# Patient Record
Sex: FEMALE | Race: WHITE | NOT HISPANIC OR LATINO | Employment: OTHER | URBAN - METROPOLITAN AREA
[De-identification: names, ages, dates, MRNs, and addresses within clinical notes are randomized per-mention and may not be internally consistent; named-entity substitution may affect disease eponyms.]

---

## 2021-05-27 ENCOUNTER — RECORDS - HEALTHEAST (OUTPATIENT)
Dept: ADMINISTRATIVE | Facility: CLINIC | Age: 66
End: 2021-05-27

## 2021-05-28 ENCOUNTER — RECORDS - HEALTHEAST (OUTPATIENT)
Dept: ADMINISTRATIVE | Facility: CLINIC | Age: 66
End: 2021-05-28

## 2021-05-29 ENCOUNTER — RECORDS - HEALTHEAST (OUTPATIENT)
Dept: ADMINISTRATIVE | Facility: CLINIC | Age: 66
End: 2021-05-29

## 2021-05-30 ENCOUNTER — RECORDS - HEALTHEAST (OUTPATIENT)
Dept: ADMINISTRATIVE | Facility: CLINIC | Age: 66
End: 2021-05-30

## 2021-06-01 ENCOUNTER — RECORDS - HEALTHEAST (OUTPATIENT)
Dept: ADMINISTRATIVE | Facility: CLINIC | Age: 66
End: 2021-06-01

## 2021-06-05 ENCOUNTER — RECORDS - HEALTHEAST (OUTPATIENT)
Dept: FAMILY MEDICINE | Facility: CLINIC | Age: 66
End: 2021-06-05

## 2021-06-05 DIAGNOSIS — M54.2 CERVICALGIA: ICD-10-CM

## 2021-06-09 ENCOUNTER — RECORDS - HEALTHEAST (OUTPATIENT)
Dept: ADMINISTRATIVE | Facility: CLINIC | Age: 66
End: 2021-06-09

## 2021-07-21 ENCOUNTER — RECORDS - HEALTHEAST (OUTPATIENT)
Dept: ADMINISTRATIVE | Facility: CLINIC | Age: 66
End: 2021-07-21

## 2021-12-28 ENCOUNTER — APPOINTMENT (OUTPATIENT)
Dept: GENERAL RADIOLOGY | Facility: CLINIC | Age: 66
End: 2021-12-28
Attending: EMERGENCY MEDICINE
Payer: MEDICARE

## 2021-12-28 ENCOUNTER — APPOINTMENT (OUTPATIENT)
Dept: GENERAL RADIOLOGY | Facility: CLINIC | Age: 66
End: 2021-12-28
Attending: PHYSICIAN ASSISTANT
Payer: MEDICARE

## 2021-12-28 ENCOUNTER — HOSPITAL ENCOUNTER (OUTPATIENT)
Facility: CLINIC | Age: 66
Setting detail: OBSERVATION
Discharge: NURSING, REHAB OR LONG-TERM CARE | End: 2021-12-31
Attending: EMERGENCY MEDICINE | Admitting: EMERGENCY MEDICINE
Payer: MEDICARE

## 2021-12-28 ENCOUNTER — APPOINTMENT (OUTPATIENT)
Dept: GENERAL RADIOLOGY | Facility: CLINIC | Age: 66
End: 2021-12-28
Attending: ORTHOPAEDIC SURGERY
Payer: MEDICARE

## 2021-12-28 DIAGNOSIS — S93.409A SPRAIN OF ANKLE, UNSPECIFIED LATERALITY, UNSPECIFIED LIGAMENT, INITIAL ENCOUNTER: ICD-10-CM

## 2021-12-28 DIAGNOSIS — S82.899A CLOSED FRACTURE OF ANKLE, UNSPECIFIED LATERALITY, INITIAL ENCOUNTER: ICD-10-CM

## 2021-12-28 DIAGNOSIS — K21.00 GASTROESOPHAGEAL REFLUX DISEASE WITH ESOPHAGITIS WITHOUT HEMORRHAGE: Primary | ICD-10-CM

## 2021-12-28 DIAGNOSIS — S52.539A CLOSED COLLES' FRACTURE, UNSPECIFIED LATERALITY, INITIAL ENCOUNTER: ICD-10-CM

## 2021-12-28 LAB
ANION GAP SERPL CALCULATED.3IONS-SCNC: 2 MMOL/L (ref 3–14)
ATRIAL RATE - MUSE: 75 BPM
BASOPHILS # BLD AUTO: 0 10E3/UL (ref 0–0.2)
BASOPHILS NFR BLD AUTO: 0 %
BUN SERPL-MCNC: 17 MG/DL (ref 7–30)
CALCIUM SERPL-MCNC: 9.2 MG/DL (ref 8.5–10.1)
CHLORIDE BLD-SCNC: 105 MMOL/L (ref 94–109)
CO2 SERPL-SCNC: 31 MMOL/L (ref 20–32)
CREAT SERPL-MCNC: 0.71 MG/DL (ref 0.52–1.04)
DIASTOLIC BLOOD PRESSURE - MUSE: NORMAL MMHG
EOSINOPHIL # BLD AUTO: 0 10E3/UL (ref 0–0.7)
EOSINOPHIL NFR BLD AUTO: 0 %
ERYTHROCYTE [DISTWIDTH] IN BLOOD BY AUTOMATED COUNT: 12.4 % (ref 10–15)
GFR SERPL CREATININE-BSD FRML MDRD: >90 ML/MIN/1.73M2
GLUCOSE BLD-MCNC: 127 MG/DL (ref 70–99)
HCT VFR BLD AUTO: 39 % (ref 35–47)
HGB BLD-MCNC: 13.3 G/DL (ref 11.7–15.7)
IMM GRANULOCYTES # BLD: 0.1 10E3/UL
IMM GRANULOCYTES NFR BLD: 1 %
INTERPRETATION ECG - MUSE: NORMAL
LYMPHOCYTES # BLD AUTO: 1.1 10E3/UL (ref 0.8–5.3)
LYMPHOCYTES NFR BLD AUTO: 11 %
MCH RBC QN AUTO: 32.5 PG (ref 26.5–33)
MCHC RBC AUTO-ENTMCNC: 34.1 G/DL (ref 31.5–36.5)
MCV RBC AUTO: 95 FL (ref 78–100)
MONOCYTES # BLD AUTO: 0.6 10E3/UL (ref 0–1.3)
MONOCYTES NFR BLD AUTO: 6 %
NEUTROPHILS # BLD AUTO: 8.2 10E3/UL (ref 1.6–8.3)
NEUTROPHILS NFR BLD AUTO: 82 %
NRBC # BLD AUTO: 0 10E3/UL
NRBC BLD AUTO-RTO: 0 /100
P AXIS - MUSE: 65 DEGREES
PLATELET # BLD AUTO: 234 10E3/UL (ref 150–450)
POTASSIUM BLD-SCNC: 3.8 MMOL/L (ref 3.4–5.3)
PR INTERVAL - MUSE: 168 MS
QRS DURATION - MUSE: 88 MS
QT - MUSE: 398 MS
QTC - MUSE: 444 MS
R AXIS - MUSE: 6 DEGREES
RBC # BLD AUTO: 4.09 10E6/UL (ref 3.8–5.2)
SARS-COV-2 RNA RESP QL NAA+PROBE: NEGATIVE
SODIUM SERPL-SCNC: 138 MMOL/L (ref 133–144)
SYSTOLIC BLOOD PRESSURE - MUSE: NORMAL MMHG
T AXIS - MUSE: 62 DEGREES
VENTRICULAR RATE- MUSE: 75 BPM
WBC # BLD AUTO: 10 10E3/UL (ref 4–11)

## 2021-12-28 PROCEDURE — 73090 X-RAY EXAM OF FOREARM: CPT | Mod: RT

## 2021-12-28 PROCEDURE — 73600 X-RAY EXAM OF ANKLE: CPT | Mod: LT

## 2021-12-28 PROCEDURE — 87635 SARS-COV-2 COVID-19 AMP PRB: CPT | Performed by: EMERGENCY MEDICINE

## 2021-12-28 PROCEDURE — 73100 X-RAY EXAM OF WRIST: CPT | Mod: RT

## 2021-12-28 PROCEDURE — 93005 ELECTROCARDIOGRAM TRACING: CPT

## 2021-12-28 PROCEDURE — 36415 COLL VENOUS BLD VENIPUNCTURE: CPT | Performed by: EMERGENCY MEDICINE

## 2021-12-28 PROCEDURE — C9803 HOPD COVID-19 SPEC COLLECT: HCPCS

## 2021-12-28 PROCEDURE — G0378 HOSPITAL OBSERVATION PER HR: HCPCS

## 2021-12-28 PROCEDURE — 99220 PR INITIAL OBSERVATION CARE,LEVEL III: CPT | Performed by: STUDENT IN AN ORGANIZED HEALTH CARE EDUCATION/TRAINING PROGRAM

## 2021-12-28 PROCEDURE — 80048 BASIC METABOLIC PNL TOTAL CA: CPT | Performed by: EMERGENCY MEDICINE

## 2021-12-28 PROCEDURE — 73130 X-RAY EXAM OF HAND: CPT | Mod: RT

## 2021-12-28 PROCEDURE — 73610 X-RAY EXAM OF ANKLE: CPT | Mod: 50

## 2021-12-28 PROCEDURE — 96374 THER/PROPH/DIAG INJ IV PUSH: CPT

## 2021-12-28 PROCEDURE — 96375 TX/PRO/DX INJ NEW DRUG ADDON: CPT | Mod: 59

## 2021-12-28 PROCEDURE — 99285 EMERGENCY DEPT VISIT HI MDM: CPT | Mod: 25

## 2021-12-28 PROCEDURE — 250N000013 HC RX MED GY IP 250 OP 250 PS 637: Performed by: HOSPITALIST

## 2021-12-28 PROCEDURE — 250N000011 HC RX IP 250 OP 636: Performed by: EMERGENCY MEDICINE

## 2021-12-28 PROCEDURE — 999N000065 XR WRIST RIGHT 2 VIEW: Mod: RT

## 2021-12-28 PROCEDURE — 96376 TX/PRO/DX INJ SAME DRUG ADON: CPT | Mod: 59

## 2021-12-28 PROCEDURE — 250N000013 HC RX MED GY IP 250 OP 250 PS 637: Performed by: STUDENT IN AN ORGANIZED HEALTH CARE EDUCATION/TRAINING PROGRAM

## 2021-12-28 PROCEDURE — 25600 CLTX DST RDL FX/EPHYS SEP WO: CPT | Mod: RT

## 2021-12-28 PROCEDURE — 85014 HEMATOCRIT: CPT | Performed by: EMERGENCY MEDICINE

## 2021-12-28 PROCEDURE — 99207 PR NO CHARGE LOS: CPT | Performed by: HOSPITALIST

## 2021-12-28 PROCEDURE — 73630 X-RAY EXAM OF FOOT: CPT | Mod: RT

## 2021-12-28 RX ORDER — DESVENLAFAXINE 50 MG/1
50 TABLET, FILM COATED, EXTENDED RELEASE ORAL DAILY
COMMUNITY

## 2021-12-28 RX ORDER — HYDROCHLOROTHIAZIDE 12.5 MG/1
12.5 CAPSULE ORAL DAILY
COMMUNITY

## 2021-12-28 RX ORDER — DILTIAZEM HYDROCHLORIDE 240 MG/1
240 CAPSULE, EXTENDED RELEASE ORAL DAILY
COMMUNITY

## 2021-12-28 RX ORDER — DESVENLAFAXINE 50 MG/1
50 TABLET, FILM COATED, EXTENDED RELEASE ORAL DAILY
Status: DISCONTINUED | OUTPATIENT
Start: 2021-12-28 | End: 2021-12-31 | Stop reason: HOSPADM

## 2021-12-28 RX ORDER — KETOCONAZOLE 20 MG/G
CREAM TOPICAL DAILY
COMMUNITY

## 2021-12-28 RX ORDER — LISINOPRIL 20 MG/1
20 TABLET ORAL DAILY
Status: DISCONTINUED | OUTPATIENT
Start: 2021-12-28 | End: 2021-12-31 | Stop reason: HOSPADM

## 2021-12-28 RX ORDER — NALOXONE HYDROCHLORIDE 0.4 MG/ML
0.2 INJECTION, SOLUTION INTRAMUSCULAR; INTRAVENOUS; SUBCUTANEOUS
Status: DISCONTINUED | OUTPATIENT
Start: 2021-12-28 | End: 2021-12-31 | Stop reason: HOSPADM

## 2021-12-28 RX ORDER — NALOXONE HYDROCHLORIDE 0.4 MG/ML
0.4 INJECTION, SOLUTION INTRAMUSCULAR; INTRAVENOUS; SUBCUTANEOUS
Status: DISCONTINUED | OUTPATIENT
Start: 2021-12-28 | End: 2021-12-31 | Stop reason: HOSPADM

## 2021-12-28 RX ORDER — DILTIAZEM HYDROCHLORIDE 240 MG/1
240 CAPSULE, COATED, EXTENDED RELEASE ORAL DAILY
Status: DISCONTINUED | OUTPATIENT
Start: 2021-12-28 | End: 2021-12-31 | Stop reason: HOSPADM

## 2021-12-28 RX ORDER — ONDANSETRON 4 MG/1
4 TABLET, ORALLY DISINTEGRATING ORAL EVERY 6 HOURS PRN
Status: DISCONTINUED | OUTPATIENT
Start: 2021-12-28 | End: 2021-12-31 | Stop reason: HOSPADM

## 2021-12-28 RX ORDER — CLOTRIMAZOLE AND BETAMETHASONE DIPROPIONATE 10; .64 MG/G; MG/G
CREAM TOPICAL 2 TIMES DAILY PRN
COMMUNITY

## 2021-12-28 RX ORDER — ONDANSETRON 2 MG/ML
4 INJECTION INTRAMUSCULAR; INTRAVENOUS EVERY 6 HOURS PRN
Status: DISCONTINUED | OUTPATIENT
Start: 2021-12-28 | End: 2021-12-31 | Stop reason: HOSPADM

## 2021-12-28 RX ORDER — FENTANYL CITRATE 50 UG/ML
100 INJECTION, SOLUTION INTRAMUSCULAR; INTRAVENOUS ONCE
Status: COMPLETED | OUTPATIENT
Start: 2021-12-28 | End: 2021-12-28

## 2021-12-28 RX ORDER — CHOLECALCIFEROL (VITAMIN D3) 50 MCG
1 TABLET ORAL DAILY
COMMUNITY

## 2021-12-28 RX ORDER — HYDROMORPHONE HYDROCHLORIDE 1 MG/ML
0.5 INJECTION, SOLUTION INTRAMUSCULAR; INTRAVENOUS; SUBCUTANEOUS
Status: DISCONTINUED | OUTPATIENT
Start: 2021-12-28 | End: 2021-12-31 | Stop reason: HOSPADM

## 2021-12-28 RX ORDER — ACETAMINOPHEN 325 MG/1
650 TABLET ORAL EVERY 6 HOURS PRN
Status: DISCONTINUED | OUTPATIENT
Start: 2021-12-28 | End: 2021-12-31 | Stop reason: HOSPADM

## 2021-12-28 RX ORDER — MUPIROCIN 20 MG/G
OINTMENT TOPICAL 2 TIMES DAILY
COMMUNITY

## 2021-12-28 RX ORDER — FAMOTIDINE, CALCIUM CARBONATE, AND MAGNESIUM HYDROXIDE 10; 800; 165 MG/1; MG/1; MG/1
1-2 TABLET, CHEWABLE ORAL DAILY PRN
COMMUNITY

## 2021-12-28 RX ORDER — MULTIPLE VITAMINS W/ MINERALS TAB 9MG-400MCG
1 TAB ORAL DAILY
COMMUNITY

## 2021-12-28 RX ORDER — ACETAMINOPHEN 650 MG/1
650 SUPPOSITORY RECTAL EVERY 6 HOURS PRN
Status: DISCONTINUED | OUTPATIENT
Start: 2021-12-28 | End: 2021-12-31 | Stop reason: HOSPADM

## 2021-12-28 RX ORDER — LISINOPRIL 20 MG/1
20 TABLET ORAL DAILY
COMMUNITY

## 2021-12-28 RX ORDER — TRAZODONE HYDROCHLORIDE 50 MG/1
75 TABLET, FILM COATED ORAL AT BEDTIME
COMMUNITY

## 2021-12-28 RX ORDER — HYDROCHLOROTHIAZIDE 12.5 MG/1
12.5 CAPSULE ORAL DAILY
Status: DISCONTINUED | OUTPATIENT
Start: 2021-12-28 | End: 2021-12-31 | Stop reason: HOSPADM

## 2021-12-28 RX ADMIN — ACETAMINOPHEN 650 MG: 325 TABLET, FILM COATED ORAL at 14:34

## 2021-12-28 RX ADMIN — HYDROMORPHONE HYDROCHLORIDE 0.5 MG: 1 INJECTION, SOLUTION INTRAMUSCULAR; INTRAVENOUS; SUBCUTANEOUS at 02:12

## 2021-12-28 RX ADMIN — OXYCODONE HYDROCHLORIDE 2.5 MG: 5 TABLET ORAL at 20:27

## 2021-12-28 RX ADMIN — FENTANYL CITRATE 100 MCG: 50 INJECTION, SOLUTION INTRAMUSCULAR; INTRAVENOUS at 04:02

## 2021-12-28 RX ADMIN — ACETAMINOPHEN 650 MG: 325 TABLET, FILM COATED ORAL at 21:59

## 2021-12-28 RX ADMIN — TRAZODONE HYDROCHLORIDE 75 MG: 50 TABLET ORAL at 22:00

## 2021-12-28 RX ADMIN — DILTIAZEM HYDROCHLORIDE 240 MG: 240 CAPSULE, COATED, EXTENDED RELEASE ORAL at 12:33

## 2021-12-28 RX ADMIN — DESVENLAFAXINE SUCCINATE 50 MG: 50 TABLET, EXTENDED RELEASE ORAL at 12:33

## 2021-12-28 RX ADMIN — HYDROMORPHONE HYDROCHLORIDE 0.5 MG: 1 INJECTION, SOLUTION INTRAMUSCULAR; INTRAVENOUS; SUBCUTANEOUS at 03:13

## 2021-12-28 RX ADMIN — LISINOPRIL 20 MG: 10 TABLET ORAL at 12:32

## 2021-12-28 RX ADMIN — ACETAMINOPHEN 650 MG: 325 TABLET, FILM COATED ORAL at 07:47

## 2021-12-28 RX ADMIN — HYDROCHLOROTHIAZIDE 12.5 MG: 12.5 CAPSULE ORAL at 12:32

## 2021-12-28 ASSESSMENT — MIFFLIN-ST. JEOR: SCORE: 1520.18

## 2021-12-28 NOTE — ED PROVIDER NOTES
History   Chief Complaint:  Fall       HPI   Tresa Combs is a 66 year old female with history of hypertension and vertigo who presents for an evaluation after a fall. The patient stated that while visiting her daughter and sleeping at her house, she needed to get up to the bathroom and being unfamiliar with the house, she has not noticed 4 steps going down and she fell forward and hit the wall. She complains of pain in both ankles and a right wrist. She had also splint placed by EMS to her right forearm. She denies hitting head or losing consciousness.     Review of Systems   Musculoskeletal: Positive for arthralgias (Both ankles and right wrist).   All other systems reviewed and are negative.    Allergies:  Ceclor  Tramadol    Medications:  aspirin 81 MG tablet  buPROPion (WELLBUTRIN SR) 150 MG 12 hr tablet  calcium carbonate (OS-AVELINO) 600 mg (1,500 mg) tablet  cyclobenzaprine (FLEXERIL) 10 MG tablet  diazepam (VALIUM) 5 MG tablet  diltiazem (CARDIZEM CD) 180 MG 24 hr capsule  estradiol (ESTRACE) 0.01 % (0.1 mg/gram) vaginal cream  fluticasone (FLONASE) 50 mcg/actuation nasal spray  lisinopril (PRINIVIL,ZESTRIL) 10 MG tablet  magnesium oxide (MAG-OX) 400 mg tablet  MEDICATION CANNOT BE REORDERED - PLEASE MANUALLY REORDER AND DISCONTINUE THE OLD ORDER  montelukast (SINGULAIR) 10 mg tablet  nitroglycerin (NITROSTAT) 0.4 MG SL tablet  omeprazole (PRILOSEC) 20 MG capsule  pravastatin (PRAVACHOL) 40 MG tablet  thiamine (VITAMIN B-1) 100 MG tablet  vit D3-folic acid-B2-B6-B12 2,000-800-0.32 unit-mcg-mg Tab    Past Medical History:    Hypertension  Depressive disorder  Fibromyalgia  Vertigo  Obesity  Obstructive sleep apnea     Past Surgical History:    Hysterectomy    Family History:    The patient denies past family history.    Social History:  The patient was brought to the emergency department by private vehicle.  The patient presents to the emergency department alone.    Physical Exam     Patient Vitals for the  past 24 hrs:   BP Temp Temp src Pulse Resp SpO2   12/29/21 0353 102/58 98.2  F (36.8  C) Oral 77 16 92 %   12/29/21 0205 -- -- -- -- -- 97 %   12/28/21 2350 107/58 98.4  F (36.9  C) Oral 82 16 92 %   12/28/21 2300 -- -- -- -- -- 95 %   12/28/21 1843 134/72 98  F (36.7  C) Oral 88 16 94 %   12/28/21 1522 129/71 97.9  F (36.6  C) Oral 89 16 96 %   12/28/21 1434 -- -- -- -- 18 --   12/28/21 1327 (!) 155/75 97.9  F (36.6  C) Oral 80 16 96 %   12/28/21 1200 (!) 143/84 98  F (36.7  C) Oral 97 16 96 %     Physical Exam  Constitutional:  Oriented to person, place, and time. Minor distress due to pain.  HENT:   Head:    Normocephalic. Atraumatic.   Mouth/Throat:   Oropharynx is clear and moist.   Eyes:    EOM are normal. Pupils are equal, round, and reactive to light.   Neck:    Neck supple.   Cardiovascular:  Normal rate, regular rhythm and normal heart sounds.      Exam reveals no gallop and no friction rub.       No murmur heard.  Pulmonary/Chest:  Effort normal and breath sounds normal.      No respiratory distress. No wheezes. No rales.      No reproducible chest wall pain.  Abdominal:   Soft. No distension. No tenderness. No rebound and no guarding.   Musculoskeletal:  Normal range of motion. 2+ distal pulses. Angulated deform ity to right wrist. Tenderness to bilateral lateral ankles. Mild swelling to left ankle.   Neurological:   Alert and oriented to person, place, and time.           Moves all 4 extremities spontaneously. Strength sensation intact to all 4 extremities.  Skin:    No rash noted. No pallor. Pain swelling ecchymosis to left ankle and right wrist.     Emergency Department Course     ECG:  ECG taken at 0627, ECG read at 0629  Normal sinus rhythm.  Septal infarct, age undetermined.  Abnormal ECG.  Rate 75 bpm. LA interval 168 ms. QRS duration 88 ms. QT/QTc 398/444 ms. P-R-T axes 65 6 62.    Imaging:  XR Ankle Left 2 Views  Final Result  IMPRESSION: Distal fibular fracture with 1 mm of  displacement.    BELLE ACUÑA MD      XR Foot Right G/E 3 Views  Final Result  IMPRESSION:   1. Fracture of the third proximal metatarsal with slight displacement  and distraction.  2. There is asymmetry and slight subluxation at the second TMT joint  (presumed Lisfranc ligament injury).    BELLE ACUÑA MD     XR Wrist Right 2 Views  Final Result  IMPRESSION: Interval decrease in angulation and displaced fracture involving the distal right radius. There remains very minimal dorsal displacement of fracture fragments. No evidence for acute displaced fracture involving the right ulna or the carpal   bones. Overlying cast material now present.    XR Wrist Right 2 Views  Final Result  IMPRESSION: There is a displaced, comminuted fracture of the distal right radius with significant dorsal angulation on the lateral image. Well-corticated density adjacent to the distal ulna may reflect an old injury. There is no acute, displaced ulnar   fracture. Soft tissue swelling.    XR Hand Right G/E 3 Views  Final Result  IMPRESSION: Redemonstrated dorsally angulated and displaced fracture of the distal right radius. No acute, displaced hand fracture.    Radius/Ulna XR, PA & LAT, right  Final Result  IMPRESSION: Redemonstrated dorsally displaced and angulated distal right radius fracture. The proximal radius and ulna are intact.    XR Ankle Bilateral G/E 3 Views  Final Result  IMPRESSION:   RIGHT ANKLE: No displaced fracture or dislocation. Normal alignment of the ankle mortise on these nonstress views.    LEFT ANKLE: Severe soft tissue swelling laterally with minimally displaced oblique fracture of the lateral malleolus. The ankle mortise is well aligned on these nonstress views. No additional fracture visualized.    Report per radiology    Laboratory:  Labs Ordered and Resulted from Time of ED Arrival to Time of ED Departure   BASIC METABOLIC PANEL - Abnormal       Result Value    Sodium 138      Potassium 3.8      Chloride 105       Carbon Dioxide (CO2) 31      Anion Gap 2 (*)     Urea Nitrogen 17      Creatinine 0.71      Calcium 9.2      Glucose 127 (*)     GFR Estimate >90     COVID-19 VIRUS (CORONAVIRUS) BY PCR - Normal    SARS CoV2 PCR Negative     CBC WITH PLATELETS AND DIFFERENTIAL    WBC Count 10.0      RBC Count 4.09      Hemoglobin 13.3      Hematocrit 39.0      MCV 95      MCH 32.5      MCHC 34.1      RDW 12.4      Platelet Count 234      % Neutrophils 82      % Lymphocytes 11      % Monocytes 6      % Eosinophils 0      % Basophils 0      % Immature Granulocytes 1      NRBCs per 100 WBC 0      Absolute Neutrophils 8.2      Absolute Lymphocytes 1.1      Absolute Monocytes 0.6      Absolute Eosinophils 0.0      Absolute Basophils 0.0      Absolute Immature Granulocytes 0.1      Absolute NRBCs 0.0          Procedures    Fracture Reduction     SITE: right wrist     CONSENT: Risks and benefits, along with alternative treatment modalities, were discussed with the patient.  The patient consented to the procedure verbally and signed the proper paperwork.    ANESTHESIA:  Hematoma block  TECHNIQUE: Traction was applied and angulation was recreated and reduced.  Good alignment was confirmed and post reduction films were obtained.  The patient tolerated the procedure well and there were no complications.     OUTCOME:  Rechecked the patient after sedation was no longer present, findings and plan explained to the patient. Patients status improved.  Pain was reduced and feeling more comfortably.        Splint Placement     Splint was applied and after placement I checked and adjusted the fit to ensure proper positioning. Patient was more comfortable with splint in place. Sensation and circulation are intact after splint placement.    Right wrist  Hematoma block:   Indications: Pain.    Anesthesia:  1% lidocain with epinephrine 10ml.   Description: The area of pain and deformity was cleaned with ChloraPrep, then in sterile fashion a 23g needle was  inserted, small amount of blood from hematoma drawn back, anesthesia instilled, area again cleaned with ChloraPrep  The patient tolerated the procedure well and there were no complication. The patient had good relief from pain after the procedure    Emergency Department Course:    Reviewed:  I reviewed nursing notes, vitals and past medical history    Assessments/Consults  0142 I obtained history and examined the patient as noted above.     Interventions:  Medications   HYDROmorphone (PF) (DILAUDID) injection 0.5 mg (0.5 mg Intravenous Given 12/28/21 0313)   melatonin tablet 1 mg (has no administration in time range)   ondansetron (ZOFRAN-ODT) ODT tab 4 mg (has no administration in time range)     Or   ondansetron (ZOFRAN) injection 4 mg (has no administration in time range)   acetaminophen (TYLENOL) tablet 650 mg (650 mg Oral Given 12/28/21 2159)     Or   acetaminophen (TYLENOL) Suppository 650 mg ( Rectal See Alternative 12/28/21 2159)   oxyCODONE IR (ROXICODONE) half-tab 2.5 mg (2.5 mg Oral Given 12/28/21 2027)   desvenlafaxine (PRISTIQ) 24 hr tablet 50 mg (50 mg Oral Given 12/28/21 1233)   diltiazem ER COATED BEADS (CARDIZEM CD/CARTIA XT) 24 hr capsule 240 mg (240 mg Oral Given 12/28/21 1233)   hydrochlorothiazide (MICROZIDE) capsule 12.5 mg (12.5 mg Oral Given 12/28/21 1232)   lisinopril (ZESTRIL) tablet 20 mg (20 mg Oral Given 12/28/21 1232)   traZODone (DESYREL) half-tab 75 mg (75 mg Oral Given 12/28/21 2200)   naloxone (NARCAN) injection 0.2 mg (has no administration in time range)     Or   naloxone (NARCAN) injection 0.4 mg (has no administration in time range)     Or   naloxone (NARCAN) injection 0.2 mg (has no administration in time range)     Or   naloxone (NARCAN) injection 0.4 mg (has no administration in time range)   fentaNYL (PF) (SUBLIMAZE) injection 100 mcg (100 mcg Intravenous Given 12/28/21 0402)     Disposition:  The patient was admitted to the hospital under the care of Dr. Figueroa.        Impression & Plan     Medical Decision Making:  Tresa Combs is a 66 year old female that presented to the emergency room status post mechanical fall complaining of right wrist pain bilateral ankle pain, denied head or other injuries. Unfortunately x-ray showed colles fracture, left lateral fracture of the ankle and likely ankle bailee on the right. I performed reduction and splinting (please see procedure note). Unfortunately post reduction due to being unable to right upper extremity with bilateral ankles injury she was not ambulatory and required admission with physical therapy discharge planning. The patient will be admitted.     Diagnosis:    ICD-10-CM    1. Closed Colles' fracture, unspecified laterality, initial encounter  S52.539A    2. Closed fracture of ankle, unspecified laterality, initial encounter  S82.899A    3. Sprain of ankle, unspecified laterality, unspecified ligament, initial encounter  S93.409A      Scribe Disclosure:  Jabari JOHNS, am serving as a scribe at 1:41 AM on 12/28/2021 to document services personally performed by Pablito Peter MD based on my observations and the provider's statements to me.            Pablito Peter MD  12/29/21 0802

## 2021-12-28 NOTE — ED NOTES
[facility]  ED Nurse Handoff Report    ED Chief complaint: Fall      ED Diagnosis:   Final diagnoses:   Closed Colles' fracture, unspecified laterality, initial encounter   Closed fracture of ankle, unspecified laterality, initial encounter   Sprain of ankle, unspecified laterality, unspecified ligament, initial encounter       Code Status: not addressed    Allergies:   Allergies   Allergen Reactions     Ceclor [Cefaclor] Rash     Tramadol Rash       Patient Story: fell down 4 stairs hitting wall  Focused Assessment:  C/o pain to right wrist and bilateral ankles. Right wrist splinted. Cast boot applied to left LE and aircast splint to right ankle. Having difficulty with ambulation.    Treatments and/or interventions provided: splint right forearm, cast boot left LE, and aircast splint right ankle  Patient's response to treatments and/or interventions: diffuculty with ambulation with splints    To be done/followed up on inpatient unit:  monitor activity    Does this patient have any cognitive concerns?: none    Activity level - Baseline/Home:  Independent  Activity Level - Current:   Stand with assist x2 and walker    Patient's Preferred language: English   Needed?: No    Isolation: None  Infection: Not Applicable  Patient tested for COVID 19 prior to admission: YES  Bariatric?: No    Vital Signs:   Vitals:    12/28/21 0305 12/28/21 0400 12/28/21 0417 12/28/21 0430   BP: 133/69 132/84  116/67   Pulse: 72   72   Resp:       Temp:       TempSrc:       SpO2: 97% 93% 92% 98%   Weight:       Height:           Cardiac Rhythm:     Was the PSS-3 completed:   Yes  What interventions are required if any?  none             Family Comments: daughter was here but went home. Is here visiting family from [place].  OBS brochure/video discussed/provided to patient/family: Yes              Name of person given brochure if not patient:               Relationship to patient:     For the majority of the shift  this patient's behavior was Green.   Behavioral interventions performed were .    ED NURSE PHONE NUMBER: [phone]

## 2021-12-28 NOTE — H&P (VIEW-ONLY)
[facility]    History and Physical - Hospitalist Service       Date of Admission:  12/28/2021    Assessment & Plan      Tresa Combs is a 66 year old female with past medical history significant for HTN admitted on 12/28/2021 with a fall.     Mechanical Fall  R radius Fracture   L lateral malleolus fracture  R ankle sprain  The patient presents to the ED after a mechanical fall. She is staying with her daughter and reports that she got up to use the restroom and fell down 4 steps. She endorsed pain to both ankles and her right wrist.   * XR of the R forearm shows a displaced, comminuted fracture of the distal right radius with significant dorsal angulation on the lateral image  *XR of the L ankle shows Severe soft tissue swelling laterally with minimally displaced oblique fracture of the lateral malleolus.   * Her radial fracture was reduced and splinted in the ED. However with the ankle fracture and the wrist fracture, she was having significant difficulty ambulating and was not safe to discharge home.   - Orthopedics consult   - Pain control PRN   - Physical therapy consult   - CC/SW consult     HTN  Awaiting med rec. Per patient's list PTA medications include hydrochlorothiazide 12.5mg, Diltiazem 240mg, and lisinopril 20mg daily   - Resume when verified     Obesity  Body mass index is 32.89 kg/m      Diet: Regular diet   DVT Prophylaxis: Pneumatic Compression Devices  León Catheter: Not present  Central Lines: None  Code Status: Full Code     Disposition Plan   Expected Discharge: TBD  Anticipated discharge location:  Awaiting care coordination huddle       The patient's care was discussed with the Patient.    Katina Figueroa  [facility]  Securely message with the Vocera Web Console (learn more here)  Text page via [employer] Paging/Directory        ______________________________________________________________________    Chief Complaint   Fall    History is  obtained from the patient    History of Present Illness   Tresa Combs is a 66 year old female who presents to the ED after a fall.     The patient is visiting from [place] and is staying with her daughter. She woke up in the middle of the night and didn't realize there were 4 steps going down to get to where the bathroom was. She fell down the 4 stairs and hit the wall. She did not hit her head but noted immediate pain in her R wrist and bilateral ankles. She was able to call out for help and her daughter came to her assistance. She did not lose consciousness.     In the ED she was found to have a R radius Fracture and a L lateral malleolus fracture. The wrist was reduced and splinted in the ED and the patient was placed in a boot for the ankle fracture. Given that she could not use her right arm, the patient was having significant difficulty ambulating safely. She will be admitted for PT and possible placement. She would like to go back to her daughters house and eventually fly back to [place].     Review of Systems    The 10 point Review of Systems is negative other than noted in the HPI or here.     Past Medical History    I have reviewed this patient's medical history and updated it with pertinent information if needed.   Past Medical History:   Diagnosis Date     Hypertension        Past Surgical History   I have reviewed this patient's surgical history and updated it with pertinent information if needed.  Past Surgical History:   Procedure Laterality Date     HI VAGINAL HYSTERECTOMY,UTERUS 250 GMS/<      Description: Vaginal Hysterectomy;  Recorded: 01/28/2013;     ZZC TOTAL ABDOM HYSTERECTOMY      Description: Hysterectomy;  Recorded: 08/20/2008;       Social History   I have reviewed this patient's social history and updated it with pertinent information if needed.  Social History     Tobacco Use     Smoking status: Not on file     Smokeless tobacco: Not on file   Substance Use Topics     Alcohol use: Not  on file     Drug use: Not on file       Family History   Reviewed and non-contributory     Prior to Admission Medications   Prior to Admission Medications   Prescriptions Last Dose Informant Patient Reported? Taking?   MEDICATION CANNOT BE REORDERED - PLEASE MANUALLY REORDER AND DISCONTINUE THE OLD ORDER   Yes No   Sig: [OMEGA-3 FATTY ACIDS-VITAMIN E (FISH OIL) 1,000 MG CAP] Use As Directed.   aspirin 81 MG tablet   Yes No   Sig: [ASPIRIN 81 MG TABLET] Take 81 mg by mouth daily.   buPROPion (WELLBUTRIN SR) 150 MG 12 hr tablet   Yes No   Sig: [BUPROPION (WELLBUTRIN SR) 150 MG 12 HR TABLET] Take 150 mg by mouth 2 (two) times a day.   calcium carbonate (OS-AVELINO) 600 mg (1,500 mg) tablet   Yes No   Sig: [CALCIUM CARBONATE (OS-AVELINO) 600 MG (1,500 MG) TABLET] Take 600 mg by mouth 2 (two) times a day with meals.   cyclobenzaprine (FLEXERIL) 10 MG tablet   No No   Sig: [CYCLOBENZAPRINE (FLEXERIL) 10 MG TABLET] TAKE ONE TABLET BY MOUTH AT BEDTIME   diazepam (VALIUM) 5 MG tablet   Yes No   Sig: [DIAZEPAM (VALIUM) 5 MG TABLET] Take 5 mg by mouth. 30 minutes prior to procedure   diltiazem (CARDIZEM CD) 180 MG 24 hr capsule   Yes No   Sig: [DILTIAZEM (CARDIZEM CD) 180 MG 24 HR CAPSULE] Take 180 mg by mouth once daily.   estradiol (ESTRACE) 0.01 % (0.1 mg/gram) vaginal cream   Yes No   Sig: [ESTRADIOL (ESTRACE) 0.01 % (0.1 MG/GRAM) VAGINAL CREAM] Use As Directed 2 (two) times a week.   fluticasone (FLONASE) 50 mcg/actuation nasal spray   Yes No   Sig: [FLUTICASONE (FLONASE) 50 MCG/ACTUATION NASAL SPRAY] 1 spray into each nostril once daily.   lisinopril (PRINIVIL,ZESTRIL) 10 MG tablet   Yes No   Sig: [LISINOPRIL (PRINIVIL,ZESTRIL) 10 MG TABLET] Take 10 mg by mouth daily.   magnesium oxide (MAG-OX) 400 mg tablet   Yes No   Sig: [MAGNESIUM OXIDE (MAG-OX) 400 MG TABLET] Take by mouth.   montelukast (SINGULAIR) 10 mg tablet   Yes No   Sig: [MONTELUKAST (SINGULAIR) 10 MG TABLET] Take by mouth.   nitroglycerin (NITROSTAT) 0.4 MG SL  tablet   Yes No   Sig: [NITROGLYCERIN (NITROSTAT) 0.4 MG SL TABLET] 0.4 mg. Dissolve 1 tablet under the tongue as needed for chest pain. May repeat every 5 minutes for 2 more doses.   omeprazole (PRILOSEC) 20 MG capsule   Yes No   Sig: [OMEPRAZOLE (PRILOSEC) 20 MG CAPSULE] Take 20 mg by mouth daily.   pravastatin (PRAVACHOL) 40 MG tablet   Yes No   Sig: [PRAVASTATIN (PRAVACHOL) 40 MG TABLET] Take 40 mg by mouth daily.   thiamine (VITAMIN B-1) 100 MG tablet   Yes No   Sig: [THIAMINE (VITAMIN B-1) 100 MG TABLET] Take by mouth.   vit D3-folic acid-B2-B6-B12 2,000-800-0.32 unit-mcg-mg Tab   Yes No   Sig: [VIT D3-FOLIC ACID-B2-B6-B12 2,000-800-0.32 UNIT-MCG-MG TAB] Take by mouth.      Facility-Administered Medications: None     Allergies   Allergies   Allergen Reactions     Ceclor [Cefaclor] Rash     Tramadol Rash       Physical Exam   Vital Signs: Temp: 97.8  F (36.6  C) Temp src: Oral BP: 116/67 Pulse: 72   Resp: 16 SpO2: 98 % O2 Device: Nasal cannula Oxygen Delivery: 2 LPM  Weight: 210 lbs 0 oz    Constitutional: Awake, alert, cooperative, no apparent distress.  Eyes: Conjunctiva and pupils examined and normal.  HEENT: Moist mucous membranes, normal dentition.  Respiratory: Clear to auscultation bilaterally, no crackles or wheezing.  Cardiovascular: Regular rate and rhythm, normal S1 and S2, and no murmur noted.  GI: Soft, non-distended, non-tender, normal bowel sounds.  Skin: No rashes, no cyanosis, no edema.  Musculoskeletal: Right arm is bandaged, left foot in boot, right ankle splinted   Neurologic: Cranial nerves 2-12 intact, normal strength and sensation.  Psychiatric: Alert, oriented to person, place and time, no obvious anxiety or depression.      Data   Data reviewed today: I reviewed all medications, new labs and imaging results over the last 24 hours.     No lab results found in last 7 days.    Recent Results (from the past 24 hour(s))   XR Ankle Bilateral G/E 3 Views    Narrative    EXAM: XR ANKLE  BILATERAL G/E 3 VIEWS  LOCATION: [facility]  DATE/TIME: 12/28/2021 2:06 AM    INDICATION: Injury and pain.  COMPARISON: None.      Impression    IMPRESSION:   RIGHT ANKLE: No displaced fracture or dislocation. Normal alignment of the ankle mortise on these nonstress views.    LEFT ANKLE: Severe soft tissue swelling laterally with minimally displaced oblique fracture of the lateral malleolus. The ankle mortise is well aligned on these nonstress views. No additional fracture visualized.   Radius/Ulna XR, PA & LAT, right    Narrative    EXAM: XR FOREARM RIGHT 2 VIEWS  LOCATION: [facility]  DATE/TIME: 12/28/2021 2:06 AM    INDICATION: Pain.  COMPARISON: Wrist radiographs performed concurrently.      Impression    IMPRESSION: Redemonstrated dorsally displaced and angulated distal right radius fracture. The proximal radius and ulna are intact.   XR Hand Right G/E 3 Views    Narrative    EXAM: XR HAND RT G/E 3 VW  LOCATION: [facility]  DATE/TIME: 12/28/2021 2:06 AM    INDICATION: Pain.  COMPARISON: Wrist radiographs performed concurrently.      Impression    IMPRESSION: Redemonstrated dorsally angulated and displaced fracture of the distal right radius. No acute, displaced hand fracture.   XR Wrist Right 2 Views    Narrative    EXAM: XR WRIST RIGHT 2 VIEW  LOCATION: [facility]  DATE/TIME: 12/28/2021 2:06 AM    INDICATION: Injury and pain.  COMPARISON: None.      Impression    IMPRESSION: There is a displaced, comminuted fracture of the distal right radius with significant dorsal angulation on the lateral image. Well-corticated density adjacent to the distal ulna may reflect an old injury. There is no acute, displaced ulnar   fracture. Soft tissue swelling.   XR Wrist Right 2 Views    Narrative    EXAM: XR WRIST RIGHT 2 VIEW  LOCATION: [facility]  DATE/TIME: 12/28/2021 4:35 AM    INDICATION: Post  reduction images of an impacted right wrist fracture.  COMPARISON: 12/28/2021      Impression    IMPRESSION: Interval decrease in angulation and displaced fracture involving the distal right radius. There remains very minimal dorsal displacement of fracture fragments. No evidence for acute displaced fracture involving the right ulna or the carpal   bones. Overlying cast material now present.

## 2021-12-28 NOTE — PROGRESS NOTES
Consult received.    Right distal radius fracture, well reduced by ED providers.    Nondisplaced left ankle lateral malleolus fracture,  Will obtain external rotation gravity stress XR to confirm stability.  If stable, will be able to WBAT in CAM boot.    Neither injury will require surgery during this hospitalization, follow up with Dr Ordoñez (hand surgeon) as outpatient to discuss op vs nonop management of distal radius fracture.      Pablito Anand MD  [facility]

## 2021-12-28 NOTE — PROGRESS NOTES
Brief, no charge note; patient admitted by my colleague Dr Figueroa early this morning    Tresa Combs is a 66 year old female with PMH significant for HTN admitted on 12/28/2021 with a mechanical fall resulting in right radius fracture and Left lateral malleolus fracture    -Evaluated by orthopedics, suggested nonoperative measures for both; also obtaining external rotation gravity stress XRay to confirm stability and if stable plan for WBAT in CAM boot for the lateral malleolus fracture  -pain control with PRN pain medications  -PT evaluation pending  - SW for disposition planning    Rest care plan per H&P by Dr Figueroa

## 2021-12-28 NOTE — PROVIDER NOTIFICATION
Provider Notification    Notified Person: PA    Notified Person Name: Peyton Yeh    Notification Date/Time: 12/28 @ 5672    Notification Interaction: Cell phone    Purpose of Notification: Right foot xrays complete    Orders Received: Peyton will update her note. Non weight bear for now.

## 2021-12-28 NOTE — H&P
[facility]    History and Physical - Hospitalist Service       Date of Admission:  12/28/2021    Assessment & Plan      Tresa Combs is a 66 year old female with past medical history significant for HTN admitted on 12/28/2021 with a fall.     Mechanical Fall  R radius Fracture   L lateral malleolus fracture  R ankle sprain  The patient presents to the ED after a mechanical fall. She is staying with her daughter and reports that she got up to use the restroom and fell down 4 steps. She endorsed pain to both ankles and her right wrist.   * XR of the R forearm shows a displaced, comminuted fracture of the distal right radius with significant dorsal angulation on the lateral image  *XR of the L ankle shows Severe soft tissue swelling laterally with minimally displaced oblique fracture of the lateral malleolus.   * Her radial fracture was reduced and splinted in the ED. However with the ankle fracture and the wrist fracture, she was having significant difficulty ambulating and was not safe to discharge home.   - Orthopedics consult   - Pain control PRN   - Physical therapy consult   - CC/SW consult     HTN  Awaiting med rec. Per patient's list PTA medications include hydrochlorothiazide 12.5mg, Diltiazem 240mg, and lisinopril 20mg daily   - Resume when verified     Obesity  Body mass index is 32.89 kg/m      Diet: Regular diet   DVT Prophylaxis: Pneumatic Compression Devices  León Catheter: Not present  Central Lines: None  Code Status: Full Code     Disposition Plan   Expected Discharge: TBD  Anticipated discharge location:  Awaiting care coordination huddle       The patient's care was discussed with the Patient.    aKtina Figueroa  [facility]  Securely message with the Vocera Web Console (learn more here)  Text page via [employer] Paging/Directory        ______________________________________________________________________    Chief Complaint   Fall    History is  obtained from the patient    History of Present Illness   Tresa Combs is a 66 year old female who presents to the ED after a fall.     The patient is visiting from [place] and is staying with her daughter. She woke up in the middle of the night and didn't realize there were 4 steps going down to get to where the bathroom was. She fell down the 4 stairs and hit the wall. She did not hit her head but noted immediate pain in her R wrist and bilateral ankles. She was able to call out for help and her daughter came to her assistance. She did not lose consciousness.     In the ED she was found to have a R radius Fracture and a L lateral malleolus fracture. The wrist was reduced and splinted in the ED and the patient was placed in a boot for the ankle fracture. Given that she could not use her right arm, the patient was having significant difficulty ambulating safely. She will be admitted for PT and possible placement. She would like to go back to her daughters house and eventually fly back to [place].     Review of Systems    The 10 point Review of Systems is negative other than noted in the HPI or here.     Past Medical History    I have reviewed this patient's medical history and updated it with pertinent information if needed.   Past Medical History:   Diagnosis Date     Hypertension        Past Surgical History   I have reviewed this patient's surgical history and updated it with pertinent information if needed.  Past Surgical History:   Procedure Laterality Date     MI VAGINAL HYSTERECTOMY,UTERUS 250 GMS/<      Description: Vaginal Hysterectomy;  Recorded: 01/28/2013;     ZZC TOTAL ABDOM HYSTERECTOMY      Description: Hysterectomy;  Recorded: 08/20/2008;       Social History   I have reviewed this patient's social history and updated it with pertinent information if needed.  Social History     Tobacco Use     Smoking status: Not on file     Smokeless tobacco: Not on file   Substance Use Topics     Alcohol use: Not  on file     Drug use: Not on file       Family History   Reviewed and non-contributory     Prior to Admission Medications   Prior to Admission Medications   Prescriptions Last Dose Informant Patient Reported? Taking?   MEDICATION CANNOT BE REORDERED - PLEASE MANUALLY REORDER AND DISCONTINUE THE OLD ORDER   Yes No   Sig: [OMEGA-3 FATTY ACIDS-VITAMIN E (FISH OIL) 1,000 MG CAP] Use As Directed.   aspirin 81 MG tablet   Yes No   Sig: [ASPIRIN 81 MG TABLET] Take 81 mg by mouth daily.   buPROPion (WELLBUTRIN SR) 150 MG 12 hr tablet   Yes No   Sig: [BUPROPION (WELLBUTRIN SR) 150 MG 12 HR TABLET] Take 150 mg by mouth 2 (two) times a day.   calcium carbonate (OS-AVELINO) 600 mg (1,500 mg) tablet   Yes No   Sig: [CALCIUM CARBONATE (OS-AVELINO) 600 MG (1,500 MG) TABLET] Take 600 mg by mouth 2 (two) times a day with meals.   cyclobenzaprine (FLEXERIL) 10 MG tablet   No No   Sig: [CYCLOBENZAPRINE (FLEXERIL) 10 MG TABLET] TAKE ONE TABLET BY MOUTH AT BEDTIME   diazepam (VALIUM) 5 MG tablet   Yes No   Sig: [DIAZEPAM (VALIUM) 5 MG TABLET] Take 5 mg by mouth. 30 minutes prior to procedure   diltiazem (CARDIZEM CD) 180 MG 24 hr capsule   Yes No   Sig: [DILTIAZEM (CARDIZEM CD) 180 MG 24 HR CAPSULE] Take 180 mg by mouth once daily.   estradiol (ESTRACE) 0.01 % (0.1 mg/gram) vaginal cream   Yes No   Sig: [ESTRADIOL (ESTRACE) 0.01 % (0.1 MG/GRAM) VAGINAL CREAM] Use As Directed 2 (two) times a week.   fluticasone (FLONASE) 50 mcg/actuation nasal spray   Yes No   Sig: [FLUTICASONE (FLONASE) 50 MCG/ACTUATION NASAL SPRAY] 1 spray into each nostril once daily.   lisinopril (PRINIVIL,ZESTRIL) 10 MG tablet   Yes No   Sig: [LISINOPRIL (PRINIVIL,ZESTRIL) 10 MG TABLET] Take 10 mg by mouth daily.   magnesium oxide (MAG-OX) 400 mg tablet   Yes No   Sig: [MAGNESIUM OXIDE (MAG-OX) 400 MG TABLET] Take by mouth.   montelukast (SINGULAIR) 10 mg tablet   Yes No   Sig: [MONTELUKAST (SINGULAIR) 10 MG TABLET] Take by mouth.   nitroglycerin (NITROSTAT) 0.4 MG SL  tablet   Yes No   Sig: [NITROGLYCERIN (NITROSTAT) 0.4 MG SL TABLET] 0.4 mg. Dissolve 1 tablet under the tongue as needed for chest pain. May repeat every 5 minutes for 2 more doses.   omeprazole (PRILOSEC) 20 MG capsule   Yes No   Sig: [OMEPRAZOLE (PRILOSEC) 20 MG CAPSULE] Take 20 mg by mouth daily.   pravastatin (PRAVACHOL) 40 MG tablet   Yes No   Sig: [PRAVASTATIN (PRAVACHOL) 40 MG TABLET] Take 40 mg by mouth daily.   thiamine (VITAMIN B-1) 100 MG tablet   Yes No   Sig: [THIAMINE (VITAMIN B-1) 100 MG TABLET] Take by mouth.   vit D3-folic acid-B2-B6-B12 2,000-800-0.32 unit-mcg-mg Tab   Yes No   Sig: [VIT D3-FOLIC ACID-B2-B6-B12 2,000-800-0.32 UNIT-MCG-MG TAB] Take by mouth.      Facility-Administered Medications: None     Allergies   Allergies   Allergen Reactions     Ceclor [Cefaclor] Rash     Tramadol Rash       Physical Exam   Vital Signs: Temp: 97.8  F (36.6  C) Temp src: Oral BP: 116/67 Pulse: 72   Resp: 16 SpO2: 98 % O2 Device: Nasal cannula Oxygen Delivery: 2 LPM  Weight: 210 lbs 0 oz    Constitutional: Awake, alert, cooperative, no apparent distress.  Eyes: Conjunctiva and pupils examined and normal.  HEENT: Moist mucous membranes, normal dentition.  Respiratory: Clear to auscultation bilaterally, no crackles or wheezing.  Cardiovascular: Regular rate and rhythm, normal S1 and S2, and no murmur noted.  GI: Soft, non-distended, non-tender, normal bowel sounds.  Skin: No rashes, no cyanosis, no edema.  Musculoskeletal: Right arm is bandaged, left foot in boot, right ankle splinted   Neurologic: Cranial nerves 2-12 intact, normal strength and sensation.  Psychiatric: Alert, oriented to person, place and time, no obvious anxiety or depression.      Data   Data reviewed today: I reviewed all medications, new labs and imaging results over the last 24 hours.     No lab results found in last 7 days.    Recent Results (from the past 24 hour(s))   XR Ankle Bilateral G/E 3 Views    Narrative    EXAM: XR ANKLE  BILATERAL G/E 3 VIEWS  LOCATION: [facility]  DATE/TIME: 12/28/2021 2:06 AM    INDICATION: Injury and pain.  COMPARISON: None.      Impression    IMPRESSION:   RIGHT ANKLE: No displaced fracture or dislocation. Normal alignment of the ankle mortise on these nonstress views.    LEFT ANKLE: Severe soft tissue swelling laterally with minimally displaced oblique fracture of the lateral malleolus. The ankle mortise is well aligned on these nonstress views. No additional fracture visualized.   Radius/Ulna XR, PA & LAT, right    Narrative    EXAM: XR FOREARM RIGHT 2 VIEWS  LOCATION: [facility]  DATE/TIME: 12/28/2021 2:06 AM    INDICATION: Pain.  COMPARISON: Wrist radiographs performed concurrently.      Impression    IMPRESSION: Redemonstrated dorsally displaced and angulated distal right radius fracture. The proximal radius and ulna are intact.   XR Hand Right G/E 3 Views    Narrative    EXAM: XR HAND RT G/E 3 VW  LOCATION: [facility]  DATE/TIME: 12/28/2021 2:06 AM    INDICATION: Pain.  COMPARISON: Wrist radiographs performed concurrently.      Impression    IMPRESSION: Redemonstrated dorsally angulated and displaced fracture of the distal right radius. No acute, displaced hand fracture.   XR Wrist Right 2 Views    Narrative    EXAM: XR WRIST RIGHT 2 VIEW  LOCATION: [facility]  DATE/TIME: 12/28/2021 2:06 AM    INDICATION: Injury and pain.  COMPARISON: None.      Impression    IMPRESSION: There is a displaced, comminuted fracture of the distal right radius with significant dorsal angulation on the lateral image. Well-corticated density adjacent to the distal ulna may reflect an old injury. There is no acute, displaced ulnar   fracture. Soft tissue swelling.   XR Wrist Right 2 Views    Narrative    EXAM: XR WRIST RIGHT 2 VIEW  LOCATION: [facility]  DATE/TIME: 12/28/2021 4:35 AM    INDICATION: Post  reduction images of an impacted right wrist fracture.  COMPARISON: 12/28/2021      Impression    IMPRESSION: Interval decrease in angulation and displaced fracture involving the distal right radius. There remains very minimal dorsal displacement of fracture fragments. No evidence for acute displaced fracture involving the right ulna or the carpal   bones. Overlying cast material now present.

## 2021-12-28 NOTE — CONSULTS
[facility]    Orthopedic Consultation    Tresa Combs MRN# [id number]   Age: 66 year old YOB: 1955     Date of Admission: 12/28/2021    Reason for consult: Right distal radius fracture  Left non-displaced distal fibula fracture  Right proximal foot pain, possible fracture       Requesting provider: Katina Figueroa       Level of consult: Consult, follow and place orders           Assessment and Plan:   Assessment:   Right distal radius fracture  Left non-displaced distal fibula fracture  Right proximal foot pain, possible fracture      Plan:   Keep splint applied to right UE  Non-WB through right wrist but okay to use platform walker  Elevate on 3 pillows when at rest    CAM boot on left ankle.  Ok to remove for icing  WBAT Left LE in boot  Elevate and ice when at rest    Right foot x-rays ordered  Patient does not need the air cast.   NWB on Right foot until Xrays obtained.  Ice and elevate            Chief Complaint:   Right wrist and bilateral ankle/foot pain          History of Present Illness:   Tresa Combs is a 66 year old female who presented to the ED after a fall.     The patient is visiting from [place] and is staying with her daughter. She woke up in the middle of the night and didn't realize there were 4 steps going down to get to where the bathroom was. She fell down the 4 stairs and hit the wall. She had immediate pain in her Right wrist, right foot and left ankle.   In the ED she was found to have a Right distal radius Fracture and a Left lateral malleolus fracture. The wrist was reduced and splinted in the ED and the patient was placed in a boot for the ankle fracture. Xrays of the right ankle were negative and an air cast was applied.  She reports continued pain in her right foot              Past Medical History:     Past Medical History:   Diagnosis Date     Hypertension              Past Surgical History:     Past Surgical History:   Procedure  Laterality Date     OR VAGINAL HYSTERECTOMY,UTERUS 250 GMS/<      Description: Vaginal Hysterectomy;  Recorded: 01/28/2013;     ZZC TOTAL ABDOM HYSTERECTOMY      Description: Hysterectomy;  Recorded: 08/20/2008;             Social History:     Social History     Tobacco Use     Smoking status: Not on file     Smokeless tobacco: Not on file   Substance Use Topics     Alcohol use: Not on file             Family History:   No family history on file.          Immunizations:     VACCINE/DOSE   Diptheria   DPT   DTAP   HBIG   Hepatitis A   Hepatitis B   HIB   Influenza   Measles   Meningococcal   MMR   Mumps   Pneumococcal   Polio   Rubella   Small Pox   TDAP   Varicella   Zoster             Allergies:     Allergies   Allergen Reactions     Ceclor [Cefaclor] Rash     Tramadol Rash             Medications:     Current Facility-Administered Medications   Medication     acetaminophen (TYLENOL) tablet 650 mg    Or     acetaminophen (TYLENOL) Suppository 650 mg     desvenlafaxine (PRISTIQ) 24 hr tablet 50 mg     diltiazem ER COATED BEADS (CARDIZEM CD/CARTIA XT) 24 hr capsule 240 mg     hydrochlorothiazide (MICROZIDE) capsule 12.5 mg     HYDROmorphone (PF) (DILAUDID) injection 0.5 mg     lisinopril (ZESTRIL) tablet 20 mg     melatonin tablet 1 mg     naloxone (NARCAN) injection 0.2 mg    Or     naloxone (NARCAN) injection 0.4 mg    Or     naloxone (NARCAN) injection 0.2 mg    Or     naloxone (NARCAN) injection 0.4 mg     ondansetron (ZOFRAN-ODT) ODT tab 4 mg    Or     ondansetron (ZOFRAN) injection 4 mg     oxyCODONE IR (ROXICODONE) half-tab 2.5 mg     traZODone (DESYREL) half-tab 75 mg             Review of Systems:   ROS:  10 point ROS neg other than the symptoms noted above in the HPI.            Physical Exam:   All vitals have been reviewed  Patient Vitals for the past 24 hrs:   BP Temp Temp src Pulse Resp SpO2 Height Weight   12/28/21 1327 (!) 155/75 97.9  F (36.6  C) Oral 80 16 96 % -- --   12/28/21 1200 (!) 143/84 98  F  "(36.7  C) Oral 97 16 96 % -- --   12/28/21 0500 122/68 -- -- 72 -- 97 % -- --   12/28/21 0430 116/67 -- -- 72 -- 98 % -- --   12/28/21 0417 -- -- -- -- -- 92 % -- --   12/28/21 0400 132/84 -- -- -- -- 93 % -- --   12/28/21 0305 133/69 -- -- 72 -- 97 % -- --   12/28/21 0300 133/69 -- -- -- -- 93 % -- --   12/28/21 0137 (!) 153/80 97.8  F (36.6  C) Oral 77 16 97 % 1.702 m (5' 7\") 95.3 kg (210 lb)     No intake or output data in the 24 hours ending 12/28/21 1424      Physical Exam   Temp: 97.9  F (36.6  C) Temp src: Oral BP: (!) 155/75 Pulse: 80   Resp: 16 SpO2: 96 % O2 Device: None (Room air) Oxygen Delivery: 2 LPM  Vital Signs with Ranges  Temp:  [97.8  F (36.6  C)-98  F (36.7  C)] 97.9  F (36.6  C)  Pulse:  [72-97] 80  Resp:  [16] 16  BP: (116-155)/(67-84) 155/75  SpO2:  [92 %-98 %] 96 %  210 lbs 0 oz    Constitutional: Pleasant, alert, appropriate, following commands.  HEENT: Head atraumatic normocephalic. Pupils equal round and reactive to light.  Respiratory: Unlabored breathing no audible wheeze  Cardiovascular: Regular rate and rhythm per pulses  GI: Abdomen non-distended.  Lymph/Hematologic: No lymphadenopathy in areas examined  Genitourinary:  No wu  Skin: No rashes, no cyanosis, no edema.  Musculoskeletal:   Right wrist: splint intact, able to flex and extend fingers.  Sensation and pulses intact and equal bilaterally.  Brisk cap refill.    Left ankle: Lateral swelling and ecchymosis at the distal fibula.  TTP at the same.  Able to flex and extend toes.  Sensation and pulses intact.  No medial ankle tenderness   Right foot/ankle: ecchymosis and swelling along the dorsal right foot.  TTP along the proximal 2nd and 3rd metatarsals.  Neurovascularly intact.  No TTP along the medial or lateral ankle ligaments.  Able to flex/extend toes with some pain.   Neurologic: normal without focal findings, mental status, speech normal, alert and oriented x iii  Neuropsychiatric: stable            Data:   All laboratory " data reviewed  Results for orders placed or performed during the hospital encounter of 12/28/21   Radius/Ulna XR, PA & LAT, right     Status: None    Narrative    EXAM: XR FOREARM RIGHT 2 VIEWS  LOCATION: [facility]  DATE/TIME: 12/28/2021 2:06 AM    INDICATION: Pain.  COMPARISON: Wrist radiographs performed concurrently.      Impression    IMPRESSION: Redemonstrated dorsally displaced and angulated distal right radius fracture. The proximal radius and ulna are intact.   XR Wrist Right 2 Views     Status: None    Narrative    EXAM: XR WRIST RIGHT 2 VIEW  LOCATION: [facility]  DATE/TIME: 12/28/2021 2:06 AM    INDICATION: Injury and pain.  COMPARISON: None.      Impression    IMPRESSION: There is a displaced, comminuted fracture of the distal right radius with significant dorsal angulation on the lateral image. Well-corticated density adjacent to the distal ulna may reflect an old injury. There is no acute, displaced ulnar   fracture. Soft tissue swelling.   XR Hand Right G/E 3 Views     Status: None    Narrative    EXAM: XR HAND RT G/E 3 VW  LOCATION: [facility]  DATE/TIME: 12/28/2021 2:06 AM    INDICATION: Pain.  COMPARISON: Wrist radiographs performed concurrently.      Impression    IMPRESSION: Redemonstrated dorsally angulated and displaced fracture of the distal right radius. No acute, displaced hand fracture.   XR Ankle Bilateral G/E 3 Views     Status: None    Narrative    EXAM: XR ANKLE BILATERAL G/E 3 VIEWS  LOCATION: [facility]  DATE/TIME: 12/28/2021 2:06 AM    INDICATION: Injury and pain.  COMPARISON: None.      Impression    IMPRESSION:   RIGHT ANKLE: No displaced fracture or dislocation. Normal alignment of the ankle mortise on these nonstress views.    LEFT ANKLE: Severe soft tissue swelling laterally with minimally displaced oblique fracture of the lateral malleolus. The ankle mortise is well aligned  on these nonstress views. No additional fracture visualized.   XR Wrist Right 2 Views     Status: None    Narrative    EXAM: XR WRIST RIGHT 2 VIEW  LOCATION: [facility]  DATE/TIME: 12/28/2021 4:35 AM    INDICATION: Post reduction images of an impacted right wrist fracture.  COMPARISON: 12/28/2021      Impression    IMPRESSION: Interval decrease in angulation and displaced fracture involving the distal right radius. There remains very minimal dorsal displacement of fracture fragments. No evidence for acute displaced fracture involving the right ulna or the carpal   bones. Overlying cast material now present.   Basic metabolic panel     Status: Abnormal   Result Value Ref Range    Sodium 138 133 - 144 mmol/L    Potassium 3.8 3.4 - 5.3 mmol/L    Chloride 105 94 - 109 mmol/L    Carbon Dioxide (CO2) 31 20 - 32 mmol/L    Anion Gap 2 (L) 3 - 14 mmol/L    Urea Nitrogen 17 7 - 30 mg/dL    Creatinine 0.71 0.52 - 1.04 mg/dL    Calcium 9.2 8.5 - 10.1 mg/dL    Glucose 127 (H) 70 - 99 mg/dL    GFR Estimate >90 >60 mL/min/1.73m2   Asymptomatic COVID-19 Virus (Coronavirus) by PCR Nasopharyngeal     Status: Normal    Specimen: Nasopharyngeal; Swab   Result Value Ref Range    SARS CoV2 PCR Negative Negative    Narrative    Testing was performed using the sarah  SARS-CoV-2 & Influenza A/B Assay on the sarah  [employer]  System.  This test should be ordered for the detection of SARS-COV-2 in individuals who meet SARS-CoV-2 clinical and/or epidemiological criteria. Test performance is unknown in asymptomatic patients.  This test is for in vitro diagnostic use under the FDA EUA for laboratories certified under CLIA to perform moderate and/or high complexity testing. This test has not been FDA cleared or approved.  A negative test does not rule out the presence of PCR inhibitors in the specimen or target RNA in concentration below the limit of detection for the assay. The possibility of a false negative should be  considered if the patient's recent exposure or clinical presentation suggests COVID-19.  [facility] are certified under the Clinical Laboratory Improvement Amendments of 1988 (CLIA-88) as qualified to perform moderate and/or high complexity laboratory testing.   CBC with platelets and differential     Status: None   Result Value Ref Range    WBC Count 10.0 4.0 - 11.0 10e3/uL    RBC Count 4.09 3.80 - 5.20 10e6/uL    Hemoglobin 13.3 11.7 - 15.7 g/dL    Hematocrit 39.0 35.0 - 47.0 %    MCV 95 78 - 100 fL    MCH 32.5 26.5 - 33.0 pg    MCHC 34.1 31.5 - 36.5 g/dL    RDW 12.4 10.0 - 15.0 %    Platelet Count 234 150 - 450 10e3/uL    % Neutrophils 82 %    % Lymphocytes 11 %    % Monocytes 6 %    % Eosinophils 0 %    % Basophils 0 %    % Immature Granulocytes 1 %    NRBCs per 100 WBC 0 <1 /100    Absolute Neutrophils 8.2 1.6 - 8.3 10e3/uL    Absolute Lymphocytes 1.1 0.8 - 5.3 10e3/uL    Absolute Monocytes 0.6 0.0 - 1.3 10e3/uL    Absolute Eosinophils 0.0 0.0 - 0.7 10e3/uL    Absolute Basophils 0.0 0.0 - 0.2 10e3/uL    Absolute Immature Granulocytes 0.1 <=0.4 10e3/uL    Absolute NRBCs 0.0 10e3/uL   EKG 12 lead     Status: None   Result Value Ref Range    Systolic Blood Pressure  mmHg    Diastolic Blood Pressure  mmHg    Ventricular Rate 75 BPM    Atrial Rate 75 BPM    OR Interval 168 ms    QRS Duration 88 ms     ms    QTc 444 ms    P Axis 65 degrees    R AXIS 6 degrees    T Axis 62 degrees    Interpretation ECG       Sinus rhythm  Septal infarct , age undetermined  Abnormal ECG  When compared with ECG of 28-SEP-2009 15:19,  Septal infarct is now Present  Confirmed by GENERATED REPORT, COMPUTER ([id number]),  Kamilla Valverde ([id number]) on 12/28/2021 9:56:58 AM     CBC with platelets + differential     Status: None    Narrative    The following orders were created for panel order CBC with platelets + differential.  Procedure                               Abnormality         Status                      ---------                               -----------         ------                     CBC with platelets and d...[[id number]]                      Final result                 Please view results for these tests on the individual orders.          Attestation:  I have reviewed today's vital signs, notes, medications, labs and imaging with Dr. Anand.  Amount of time performed on this consult: 30 minutes.    Peyton Yeh PA-C

## 2021-12-28 NOTE — PHARMACY-ADMISSION MEDICATION HISTORY
"Pharmacy Medication History  Admission medication history interview status for the 12/28/2021  admission is complete. See EPIC admission navigator for prior to admission medications     Location of Interview: Patient room  Medication history sources: Patient, Surescripts, Patient's home med list and Care Everywhere    Significant changes made to the medication list:  Removed: aspirin 81 mg daily, wellbutrin 150 mg BID, flexeril 10 mg at bedtime, diazepam 5 mg once, estradiol vaginal cream 2x/week, fish oil daily, montelukast 10 mg daily, omeprazole 20 mg daily, pravastatin 40 mg daily, thiamine 100 mg daily  Added: mupirocin, ketoconazole, clotrimazole/betamethasone, desvenlafaxine, hydrochlorothiazide, trazodone, pepcid complete, mvi   Adjusted:   -calcium 600 mg BID --> 400 mg daily  -diltiazem 180 --> 240 mg daily  -lisinopril 10 --> 20 mg daily  -magnesium oxide daily --> reacted magnesium 2 capsules daily  -vitamin D + B complex --> vitamin D single ingredient    In the past week, patient estimated taking medication this percent of the time: greater than 90%    Additional medication history information:   Patient is a reliable historian, states working with integrative medicine provider on overall medication regimen. Acknowledges she is on multiple topical therapies PRN for under breast/groin area but did not indicate need for current use in interview. Patient stated not taking multivitamin for months, but was on written home medication list, so added to PTA med list. Patient states taking 75 mg trazodone, but also says she \"should be on 50 mg\" which is consistent with fill history dose.     Medication reconciliation completed by provider prior to medication history? No    Time spent in this activity: 30 minutes    Prior to Admission medications    Medication Sig Last Dose Taking? Auth Provider   CALCIUM PO Take 400 mg by mouth daily 12/27/2021 at AM Yes Unknown, Entered By History   clotrimazole-betamethasone " (LOTRISONE) 1-0.05 % external cream Apply topically 2 times daily as needed 2021 at Unknown time Yes Unknown, Entered By History   desvenlafaxine (PRISTIQ) 50 MG 24 hr tablet Take 50 mg by mouth daily 2021 at AM Yes Unknown, Entered By History   diltiazem ER (TIAZAC) 240 MG 24 hr ER beaded capsule Take 240 mg by mouth daily 2021 at AM Yes Unknown, Entered By History   famotidine-Ca Carb-mag hydrox (PEPCID COMPLETE) -165 MG CHEW Take 1-2 tablets by mouth daily as needed 2021 at PM Yes Unknown, Entered By History   fluticasone (FLONASE) 50 mcg/actuation nasal spray [FLUTICASONE (FLONASE) 50 MCG/ACTUATION NASAL SPRAY] 1 spray into each nostril once daily. 2021 at PM Yes Charlie Lopez DO   hydrochlorothiazide (MICROZIDE) 12.5 MG capsule Take 12.5 mg by mouth daily 2021 at AM Yes Unknown, Entered By History   ketoconazole (NIZORAL) 2 % external cream Apply topically daily Apply to affected area under breasts twice daily for 14 days for flares More than a month at Unknown time Yes Unknown, Entered By History   lisinopril (ZESTRIL) 20 MG tablet Take 20 mg by mouth daily 2021 at AM Yes Unknown, Entered By History   MAGNESIUM PO Take 2 capsules by mouth daily Ortho molecular brand reacted magnesium 235 mg per capsule 2021 at AM Yes Unknown, Entered By History   multivitamin w/minerals (THERA-VIT-M) tablet Take 1 tablet by mouth daily More than a month at Unknown time Yes Unknown, Entered By History   mupirocin (BACTROBAN) 2 % external ointment Apply topically 2 times daily More than a month at Unknown time Yes Unknown, Entered By History   nitroglycerin (NITROSTAT) 0.4 MG SL tablet Place 0.4 mg under the tongue every 5 minutes as needed  Unknown at has home supply, likely  Yes Charlie Lopez DO   traZODone (DESYREL) 50 MG tablet Take 75 mg by mouth At Bedtime 2021 at PM Yes Unknown, Entered By History   vitamin D3 (CHOLECALCIFEROL) 50 mcg  (2000 units) tablet Take 1 tablet by mouth daily 12/27/2021 at AM Yes Unknown, Entered By History       The information provided in this note is only as accurate as the sources available at the time of update(s)   Dipika MeekD

## 2021-12-28 NOTE — ED TRIAGE NOTES
Fell down 4 stairs hitting wall. Pain to right wrist and bilateral ankles. Splint in place per EMS to right forearm. Denies hitting head or LOC

## 2021-12-28 NOTE — PROGRESS NOTES
RECEIVING UNIT ED HANDOFF REVIEW    ED Nurse Handoff Report was reviewed by: Veda Ware RN on December 28, 2021 at 12:32 PM

## 2021-12-28 NOTE — PROGRESS NOTES
VSS. A/O. Pain 2/10 - given tylenol with some relief. Wants to try an Oxycodone tonight before bed. Up with pivot to BSC. Ortho following (see note for weight bear instructions). PT to evaluate tomorrow. Patient will need TCU before flying back to [place] (unable to stay with her daughter).

## 2021-12-28 NOTE — ED NOTES
Bed: ED09  Expected date: 12/28/21  Expected time: 1:15 AM  Means of arrival: Ambulance  Comments:  HEMS 426 66F fall; wrist/ankle injuries

## 2021-12-28 NOTE — PLAN OF CARE
"PRIMARY DIAGNOSIS: \"GENERIC\" NURSING  OUTPATIENT/OBSERVATION GOALS TO BE MET BEFORE DISCHARGE:  ADLs back to baseline: No    Activity and level of assistance: SBA pivot to bedside only    Pain status: Improved-controlled with oral pain medications.    Return to near baseline physical activity: No     Discharge Planner Nurse   Safe discharge environment identified: No  Barriers to discharge: Yes       Entered by: Veda Ware 12/28/2021 4:05 PM     Please review provider order for any additional goals.   Nurse to notify provider when observation goals have been met and patient is ready for discharge.  "

## 2021-12-29 ENCOUNTER — APPOINTMENT (OUTPATIENT)
Dept: PHYSICAL THERAPY | Facility: CLINIC | Age: 66
End: 2021-12-29
Attending: STUDENT IN AN ORGANIZED HEALTH CARE EDUCATION/TRAINING PROGRAM
Payer: MEDICARE

## 2021-12-29 ENCOUNTER — APPOINTMENT (OUTPATIENT)
Dept: CT IMAGING | Facility: CLINIC | Age: 66
End: 2021-12-29
Attending: PHYSICIAN ASSISTANT
Payer: MEDICARE

## 2021-12-29 LAB
CREAT SERPL-MCNC: 0.73 MG/DL (ref 0.52–1.04)
GFR SERPL CREATININE-BSD FRML MDRD: 90 ML/MIN/1.73M2

## 2021-12-29 PROCEDURE — 82565 ASSAY OF CREATININE: CPT | Performed by: HOSPITALIST

## 2021-12-29 PROCEDURE — 250N000011 HC RX IP 250 OP 636: Performed by: HOSPITALIST

## 2021-12-29 PROCEDURE — 36415 COLL VENOUS BLD VENIPUNCTURE: CPT | Performed by: HOSPITALIST

## 2021-12-29 PROCEDURE — 96372 THER/PROPH/DIAG INJ SC/IM: CPT | Performed by: HOSPITALIST

## 2021-12-29 PROCEDURE — L4361 PNEUMA/VAC WALK BOOT PRE OTS: HCPCS

## 2021-12-29 PROCEDURE — G0378 HOSPITAL OBSERVATION PER HR: HCPCS

## 2021-12-29 PROCEDURE — 97161 PT EVAL LOW COMPLEX 20 MIN: CPT | Mod: GP

## 2021-12-29 PROCEDURE — 97530 THERAPEUTIC ACTIVITIES: CPT | Mod: GP

## 2021-12-29 PROCEDURE — 250N000013 HC RX MED GY IP 250 OP 250 PS 637: Performed by: HOSPITALIST

## 2021-12-29 PROCEDURE — G1004 CDSM NDSC: HCPCS

## 2021-12-29 PROCEDURE — 99225 PR SUBSEQUENT OBSERVATION CARE,LEVEL II: CPT | Performed by: HOSPITALIST

## 2021-12-29 PROCEDURE — 250N000013 HC RX MED GY IP 250 OP 250 PS 637: Performed by: STUDENT IN AN ORGANIZED HEALTH CARE EDUCATION/TRAINING PROGRAM

## 2021-12-29 RX ORDER — FLUTICASONE PROPIONATE 50 MCG
1 SPRAY, SUSPENSION (ML) NASAL DAILY PRN
Status: DISCONTINUED | OUTPATIENT
Start: 2021-12-29 | End: 2021-12-30

## 2021-12-29 RX ORDER — FAMOTIDINE 10 MG
10 TABLET ORAL 2 TIMES DAILY
Status: DISCONTINUED | OUTPATIENT
Start: 2021-12-29 | End: 2021-12-31 | Stop reason: HOSPADM

## 2021-12-29 RX ADMIN — ENOXAPARIN SODIUM 40 MG: 40 INJECTION SUBCUTANEOUS at 16:02

## 2021-12-29 RX ADMIN — DESVENLAFAXINE SUCCINATE 50 MG: 50 TABLET, EXTENDED RELEASE ORAL at 08:34

## 2021-12-29 RX ADMIN — DILTIAZEM HYDROCHLORIDE 240 MG: 240 CAPSULE, COATED, EXTENDED RELEASE ORAL at 08:34

## 2021-12-29 RX ADMIN — LISINOPRIL 20 MG: 10 TABLET ORAL at 08:34

## 2021-12-29 RX ADMIN — HYDROCHLOROTHIAZIDE 12.5 MG: 12.5 CAPSULE ORAL at 08:33

## 2021-12-29 RX ADMIN — TRAZODONE HYDROCHLORIDE 75 MG: 50 TABLET ORAL at 21:54

## 2021-12-29 RX ADMIN — FAMOTIDINE 10 MG: 10 TABLET ORAL at 19:43

## 2021-12-29 RX ADMIN — ACETAMINOPHEN 650 MG: 325 TABLET, FILM COATED ORAL at 21:54

## 2021-12-29 ASSESSMENT — ENCOUNTER SYMPTOMS: ARTHRALGIAS: 1

## 2021-12-29 NOTE — PROGRESS NOTES
Observation goals  PRIOR TO DISCHARGE       Comments:   -diagnostic tests and consults completed and resulted not not met, PT/SW consult  -vital signs normal or at patient baseline met  -returns to baseline functional status not met  -safe disposition plan has been identified not met

## 2021-12-29 NOTE — PROGRESS NOTES
Observation Goals to be Met Prior to Discharge    -diagnostic tests and consults completed and resulted --Not Met, OT/PT/SW consults  -vital signs normal or at patient baseline --Goal Met  -returns to baseline functional status--- Not Met   -safe disposition plan has been identified -- Not Met  Nurse to notify provider when observation goals have been met and patient is ready for discharge.

## 2021-12-29 NOTE — CONSULTS
"Care Management Initial Consult    General Information  Assessment completed with: Patient,Children, Tresa & her daughter   Type of CM/SW Visit: Initial Assessment  Primary Care Provider verified and updated as needed: Yes (PCP is in [place])   Anselmo Long M.D.  Internal Medicine  [phone] (Work) [phone] (Fax)  [address] [place] [address]    Readmission within the last 30 days:        Reason for Consult: discharge planning  Advance Care Planning:    no ACP documents on file in [facility]      Communication Assessment  Patient's communication style: spoken language (English or Bilingual)    Hearing Difficulty or Deaf: no      Cognitive  Cognitive/Neuro/Behavioral: WDL                      Living Environment:   People in home: spouse  Praful   Current living Arrangements: house      Able to return to prior arrangements: no  Living Arrangement Comments: lives in [place]; was in [place] visiting her daughter in [place]     Family/Social Support:  Care provided by: self  Provides care for: no one  Marital Status:   Support system:   Praful       Description of Support System: Supportive,Involved    Support Assessment: Adequate family and caregiver support    Current Resources:   Patient receiving home care services: No   Community Resources: None  Equipment currently used at home: none  Supplies currently used at home: None    Employment/Financial:  Employment Status: retired     Financial Concerns: No concerns identified    Finance Comments: active MEDICARE/MEDICARE and [employer]/HU[employer]NA MEDICARE SUPPLEMENT insurance     Lifestyle & Psychosocial Needs:  Outpatient \"Social determinants of health\" assessment not done     Functional Status:  Prior to admission patient needed assistance: none   Assesssment of Functional Status:  (see therapy assessment & recommendations )    Mental Health Status:  Mental Health Status: No Current Concerns       Chemical Dependency Status:  Chemical " Dependency Status: No Current Concerns           Values/Beliefs:  Spiritual, Cultural Beliefs, Zoroastrianism Practices, Values that affect care: no          Values/Beliefs Comment: Christianity     Additional Information:  Met with patient and daughter in room, introduced self and role in discharge planning.  Pt is aware of her deficits and therapy recommendations, and pt and daughter agree she cannot go home, needs TCU stay. Provided TCU list for [employer] insurance and list from Medicare.gov based on dtr's zip [id number] for cross reference.  Reviewed that [facility] are not accepting [employer] insurance right now; however [facility] does.  Pt states she will need surgery on foot in ~1 week; I will ensure contact information for TCO is on the AVS.  Updated CM-SW that pt has TCU lists and will need followup for choice.  CM-RN did also provide MOON / Obs notice for pt signature.    Ana Laura Lennon RN, BSN, PHN  [facility]  Inpatient Care Management - FLOAT  Mobile: [phone] 12/29/21 until 4pm  (after today's date, please call the patient's unit)

## 2021-12-29 NOTE — PROGRESS NOTES
12/29/21 1200   Quick Adds   Type of Visit Initial PT Evaluation   Living Environment   People in home child(kay), adult;grandchild(kay)   Current Living Arrangements house   Home Accessibility stairs to enter home;stairs within home   Number of Stairs, Main Entrance 3   Stair Railings, Main Entrance none   Number of Stairs, Within Home, Primary 4   Stair Railings, Within Home, Primary railings on both sides of stairs   Transportation Anticipated family or friend will provide   Living Environment Comments Pt is from [place] visiting daughter and has been staying in her house, daughter's family would not be able to provide 24/7 care    Self-Care   Usual Activity Tolerance good   Current Activity Tolerance poor   Equipment Currently Used at Home none   Activity/Exercise/Self-Care Comment pt regularly active and was IND with all mobiliy and self cares   Disability/Function   Fall history within last six months no   Change in Functional Status Since Onset of Current Illness/Injury yes   General Information   Onset of Illness/Injury or Date of Surgery 12/28/21   Referring Physician Katina Figueroa   Patient/Family Therapy Goals Statement (PT) return home to [place]   Pertinent History of Current Problem (include personal factors and/or comorbidities that impact the POC) Tresa Combs is a 66 year old female with PMH significant for HTN admitted on 12/28/2021 with a mechanical fall resulting in right radius fracture and Left lateral malleolus fracture   Existing Precautions/Restrictions fall;weight bearing   Weight-Bearing Status - RUE nonweight-bearing  (platform walker okay per orders)   Weight-Bearing Status - LLE weight-bearing as tolerated   Weight-Bearing Status - RLE nonweight-bearing   Cognition   Orientation Status (Cognition) oriented x 3   Affect/Mental Status (Cognition) WNL   Follows Commands (Cognition) WNL   Pain Assessment   Patient Currently in Pain No   Posture    Posture Forward head  position;Protracted shoulders   Range of Motion (ROM)   ROM Comment impairments secondary to fractures in Right wrist and bilat ankles    Strength   Manual Muscle Testing Quick Adds Able to perform R SLR;Able to perform L SLR;Deficits observed during functional mobility   Strength Comments strength impairments secondary to fractures    Bed Mobility   Comment (Bed Mobility) rolling and supine<>sit Mod-I    Transfers   Transfer Safety Comments sit<>stand with platform walker Mod-A  x 2    Gait/Stairs (Locomotion)   Comment (Gait/Stairs) pt took one hop step using platform walker and Mod-A to MaxA x 2 and was unable to tolerate due to strength deficits needing assistance to sit down immediately   Balance   Balance Comments impaired secondary to fractures, pt stated felt unsteady at times prior to admission    Sensory Examination   Sensory Perception Comments pt denies   Clinical Impression   Criteria for Skilled Therapeutic Intervention yes, treatment indicated   PT Diagnosis (PT) impaired mobility   Influenced by the following impairments pain, impaired functional strength, ROM, balance imapirments, fractures   Functional limitations due to impairments fall risk, reduced independece in all functional mobility, ADL's IADL's    Clinical Presentation Stable/Uncomplicated   Clinical Presentation Rationale PMH, clinial judgment   Clinical Decision Making (Complexity) low complexity   Therapy Frequency (PT) 5x/week   Predicted Duration of Therapy Intervention (days/wks) 5 days    Planned Therapy Interventions (PT) balance training;bed mobility training;gait training;home exercise program;patient/family education;ROM (range of motion);stair training;strengthening;stretching;transfer training;progressive activity/exercise   Anticipated Equipment Needs at Discharge (PT) walker, platform   Risk & Benefits of therapy have been explained evaluation/treatment results reviewed;care plan/treatment goals reviewed;current/potential  barriers reviewed;risks/benefits reviewed;participants voiced agreement with care plan;participants included;patient;daughter   PT Discharge Planning    PT Discharge Recommendation (DC Rec) Transitional Care Facility;home with assist;home with home care physical therapy   PT Rationale for DC Rec Pt currently well below baseline due to multiple fractures in UE and LE which greatly impacts mobility and fall risk. Pt lives in [place] but was staying with daughter in home with stairs and unable to recieve 24/7 assist with all mobility which she would require now. Would need assist of 2 and platform walker at home. Currently recommend TCU to progress patient strength and mobiilyt prior to returning home to daughters or to even be able to fly home to [place]. If pt were to go back to daughters would need assistance as described above and HHPT due to taxing burden to leave home right now.    PT Brief overview of current status  assist of 2 for all transfers with Platform walker, unable to ambulate currently    Therapy Certification   Start of care date 12/29/21   Certification date from 12/29/21   Certification date to 01/05/22   Medical Diagnosis impaired mobilty   Total Evaluation Time   Total Evaluation Time (Minutes) 12

## 2021-12-29 NOTE — PROGRESS NOTES
Care Management Follow Up    Length of Stay (days): 1    Expected Discharge Date: 12/29/2021     Concerns to be Addressed:     discharge needs  Patient plan of care discussed at interdisciplinary rounds: Yes    Anticipated Discharge Disposition:  TCU     Anticipated Discharge Services:    Anticipated Discharge DME:      Patient/family educated on Medicare website which has current facility and service quality ratings:  yes  Education Provided on the Discharge Plan:  yes  Patient/Family in Agreement with the Plan:  yes    Referrals Placed by CM/SW:  TCU  Private pay costs discussed: private room/amenity fees, transportation costs and insurance costs out of pocket expenses and co-pays    Additional Information:  TCU referrals sent and pending, per patient choice.    SW to continue to follow and assist with discharge planning.    AJ Guerrero  Daytime (8:00am-4:30pm): [phone]  After-Hours SW Pager (4:30pm-11:30pm): [phone]           AJ Bejarano

## 2021-12-29 NOTE — PROGRESS NOTES
A/O x 4, vss, a-febrile, denies pain except with activity, up to bedside commode with 2 assist, and gait belt, pivot to bedside, NWB right ankle and right Wrist, WBA on rt leg, pure wick in place, OT/PT following

## 2021-12-29 NOTE — PROGRESS NOTES
"[facility]  Hospitalist Progress Note        Magdiel Wesley MD   12/29/2021        Interval History:        - Right foot Xray noted with fracture of the third proximal metatarsal with slight displacement and distraction-- ortho ordered for CT foot and rec NWB RLE in CAM boot; ortho suggest will need surgical intervention in future, per ortho \"this could be performed upon returning home to [place]\"   - WBAT left LE in CAM boot  - to keep splint applied to Rt wrist, non-WB through right wrist but okay to use platform walker  - evaluated by PT-- rec TCU; SW for disposition planning          Assessment and Plan:        Tresa Combs is a 66 year old female with PMH significant for HTN admitted on 12/28/2021 with a mechanical fall resulting in right radius fracture and Left lateral malleolus fracture. Later imaging also noted with fracture of the third proximal metatarsal with slight displacement and distraction.     She is visiting from [place], was staying with her daughter and reports that she got up to use the restroom and fell down 4 steps.    On admission,   * XR of the R forearm shows a displaced, comminuted fracture of the distal right radius with significant dorsal angulation on the lateral image  *XR of the L ankle shows Severe soft tissue swelling laterally with minimally displaced oblique fracture of the lateral malleolus.   * Her radial fracture was reduced and splinted in the ED     Mechanical Fall  R radius Fracture   Rt foot metatarsal fractures  L lateral malleolus fracture  - xray imagings noted with fractures as noted above; admitted under observation status  - CT right foot 12/29 noted with fractures involving the first, second, and third proximal  metatarsals at the tarsometatarsal joint  - evaluated by ortho, rec NWB RLE in CAM boot; ortho suggest will need surgical intervention in future, per ortho \"this could be performed upon returning home to [place]\"   - WBAT left LE in " "CAM boot  - to keep splint applied to Rt wrist, non-WB through right wrist but okay to use platform walker  - evaluated by PT-- rec TCU; SW for disposition planning    HTN  - continue PTA diltiazem , hydrochlorothiazide , lisinopril     Depression   -continue PTA Desvenlafaxine, trazodone    GERD: will start pepcid     Obesity  Body mass index is 32.89 kg/m     Orders Placed This Encounter      Regular Diet Adult    DVT Prophylaxis: will order Lovenox    Code Status: Full Code         Disposition Plan     Expected Discharge: likely 1-2 days pending ortho clearance and TCU placement      Clinically Significant Risk Factors Present on Admission                # Obesity: last Body mass index is 32.89 kg/m .       Page Me (7 am to 6 pm)              Physical Exam:      Blood pressure 102/58, pulse 77, temperature 98.2  F (36.8  C), temperature source Oral, resp. rate 16, height 1.702 m (5' 7\"), weight 95.3 kg (210 lb), SpO2 92 %.  Vitals:    12/28/21 0137   Weight: 95.3 kg (210 lb)     Vital Signs with Ranges  Temp:  [97.9  F (36.6  C)-98.4  F (36.9  C)] 98.2  F (36.8  C)  Pulse:  [77-97] 77  Resp:  [16-18] 16  BP: (102-155)/(58-84) 102/58  SpO2:  [92 %-97 %] 92 %  I/O's Last 24 hours  I/O last 3 completed shifts:  In: -   Out: 1600 [Urine:1600]    Constitutional: Alert, awake and oriented X 3; resting comfortably in no apparent distress   HEENT: Pupils equal and reactive to light and accomodation, neck supple    Oral cavity: Moist mucosa   Cardiovascular: Normal s1 s2, regular rate and rhythm, no murmur   Lungs: B/l clear to auscultation, no wheezes or crepitations   Abdomen: Soft, nt, nd, no guarding, rigidity or rebound; BS +   LE : Right wrist in splint; left LE in CAM boot; tenderness of right foot +; no significant edema   Musculoskeletal: Power 5/5 in all extremities   Neuro: No focal neurological deficits noted   Psychiatry: normal mood and affect                Medications:          desvenlafaxine  50 mg Oral " Daily     diltiazem ER COATED BEADS  240 mg Oral Daily     hydrochlorothiazide  12.5 mg Oral Daily     lisinopril  20 mg Oral Daily     traZODone  75 mg Oral At Bedtime     PRN Meds: acetaminophen **OR** acetaminophen, HYDROmorphone, melatonin, naloxone **OR** naloxone **OR** naloxone **OR** naloxone, ondansetron **OR** ondansetron, oxyCODONE         Data:      All new lab and imaging data was reviewed.   Recent Labs   Lab Test 12/28/21  0718   WBC 10.0   HGB 13.3   MCV 95         Recent Labs   Lab Test 12/28/21  0718      POTASSIUM 3.8   CHLORIDE 105   CO2 31   BUN 17   CR 0.71   ANIONGAP 2*   AVELINO 9.2   *     No lab results found.    Invalid input(s): TROP, TROPONINIES

## 2021-12-29 NOTE — PROGRESS NOTES
"Orthopedics    Assessment:    Right distal radius fracture  Left non-displaced distal fibula fracture  Right proximal foot pain: lis franc injury      Plan:    Keep splint applied to right UE  Non-WB through right wrist but okay to use platform walker  Elevate on 3 pillows when at rest     CAM boot on left ankle.  Ok to remove for icing  WBAT Left LE in boot   Elevate and ice when at rest     Right foot:   Discussed with Dr Yu.  Recommending a CT scan of the right foot (ordered).    NWB right LE in CAM boot (ordered)  Likely will need surgical intervention in the future but this could be performed upon returning home to [place].        Patient resting comfortably.  Reports most pain is in her right foot.    Going to CT scan this am.     /56   Pulse 87   Temp 97.8  F (36.6  C) (Axillary)   Resp 16   Ht 1.702 m (5' 7\")   Wt 95.3 kg (210 lb)   SpO2 96%   BMI 32.89 kg/m      Exam:   Right wrist: splint intact, able to flex and extend fingers.  Sensation and pulses intact and equal bilaterally.  Brisk cap refill.  Swelling in fingers.   Left ankle: Lateral swelling and ecchymosis at the distal fibula.  TTP at the same.  Able to flex and extend toes.  Sensation and pulses intact.  No medial ankle tenderness   Right foot/ankle: ecchymosis and swelling along the dorsal right foot.  TTP along the proximal 2nd and 3rd metatarsals.  Neurovascularly intact.  No TTP along the medial or lateral ankle ligaments.  Able to flex/extend toes with some pain.      Peyton Yeh PA-C on 12/29/2021 at 7:59 AM    "

## 2021-12-29 NOTE — PLAN OF CARE
[facility]      OUTPATIENT PHYSICAL THERAPY EVALUATION  PLAN OF TREATMENT FOR OUTPATIENT REHABILITATION  (COMPLETE FOR INITIAL CLAIMS ONLY)  Patient's Last Name, First Name, M.I.  YOB: 1955  Tresa Combs                        Provider's Name  [facility] Medical Record No.  [id number]                               Onset Date:  12/28/21   Start of Care Date:  12/29/21      Type:     _X_PT   ___OT   ___SLP Medical Diagnosis:  impaired mobilty                        PT Diagnosis:  impaired mobility   Visits from SOC:  1   _________________________________________________________________________________  Plan of Treatment/Functional Goals    Planned Interventions: balance training,bed mobility training,gait training,home exercise program,patient/family education,ROM (range of motion),stair training,strengthening,stretching,transfer training,progressive activity/exercise     Goals: See Physical Therapy Goals on Care Plan in [employer] electronic health record.    Therapy Frequency: 5x/week  Predicted Duration of Therapy Intervention: 5 days   _________________________________________________________________________________    I CERTIFY THE NEED FOR THESE SERVICES FURNISHED UNDER        THIS PLAN OF TREATMENT AND WHILE UNDER MY CARE     (Physician co-signature of this document indicates review and certification of the therapy plan).              Certification date from: 12/29/21, Certification date to: 01/05/22    Referring Physician: Katina Figueroa            Initial Assessment        See Physical Therapy evaluation dated 12/29/21 in Epic electronic health record.

## 2021-12-29 NOTE — PLAN OF CARE
A/Ox4. VSS on RA. C/o RLE pain, given PRN oxy x1, PRN tylenol x1 and ice packs. Ax1-2 w/GB pivot to bedside commode. RLE NWB and LLE in boot is WBAT, both BLE elevated on pillows. Right wrist is NWB in splint and propped on pillows. All extremeties, capillary refills <3 secs. No numbeness or tingling present. PIV S/L'd. Purewick used overnight, no redness or breakdown observed. Regular diet. Ortho following, PT and SW consults for today. discharge pending evals and TCU placement.

## 2021-12-29 NOTE — PROGRESS NOTES
S.  Patient was seen today at 513-01 for a tall Aircast.  The order is requesting a CAMwalker.  I met with the patient in her room and she is already wearing a tall Aircast on the LLE.  I went back to our office and grabbed an tall Aircast.  O/G. Help stabilize foot and ankle.  A.  Patient was fit with a size medium tall pneumatic Aircast for the RLE.  The velcro straps were unfastened, and the front panel removed.  The soft liner was opened, the patients foot and leg was positioned into the Aircast with the heel firmly sealed.  The liner was closed around the (LLE, RLE).  The front panel was attached and secured with the Velcro closure.  Donning and doffing of the Aircast was explained.  Wear and care instructions were left with the Pt.    P.   Patient was advised to contact this office with any problems or questions.  Jcarlos ZAVALA

## 2021-12-30 ENCOUNTER — APPOINTMENT (OUTPATIENT)
Dept: PHYSICAL THERAPY | Facility: CLINIC | Age: 66
End: 2021-12-30
Payer: MEDICARE

## 2021-12-30 PROCEDURE — 250N000013 HC RX MED GY IP 250 OP 250 PS 637: Performed by: HOSPITALIST

## 2021-12-30 PROCEDURE — 97530 THERAPEUTIC ACTIVITIES: CPT | Mod: GP

## 2021-12-30 PROCEDURE — 99225 PR SUBSEQUENT OBSERVATION CARE,LEVEL II: CPT | Performed by: HOSPITALIST

## 2021-12-30 PROCEDURE — 250N000013 HC RX MED GY IP 250 OP 250 PS 637: Performed by: STUDENT IN AN ORGANIZED HEALTH CARE EDUCATION/TRAINING PROGRAM

## 2021-12-30 PROCEDURE — 250N000011 HC RX IP 250 OP 636: Performed by: HOSPITALIST

## 2021-12-30 PROCEDURE — G0378 HOSPITAL OBSERVATION PER HR: HCPCS

## 2021-12-30 PROCEDURE — 96372 THER/PROPH/DIAG INJ SC/IM: CPT | Performed by: HOSPITALIST

## 2021-12-30 RX ORDER — OXYCODONE HYDROCHLORIDE 5 MG/1
2.5 TABLET ORAL EVERY 6 HOURS PRN
Qty: 10 TABLET | Refills: 0 | Status: SHIPPED | DISCHARGE
Start: 2021-12-30 | End: 2021-12-30

## 2021-12-30 RX ORDER — OXYCODONE HYDROCHLORIDE 5 MG/1
2.5 TABLET ORAL EVERY 6 HOURS PRN
Qty: 15 TABLET | Refills: 0 | Status: SHIPPED | OUTPATIENT
Start: 2021-12-30 | End: 2021-12-30

## 2021-12-30 RX ORDER — ACETAMINOPHEN 325 MG/1
650 TABLET ORAL EVERY 6 HOURS PRN
Status: ON HOLD | DISCHARGE
Start: 2021-12-30 | End: 2022-01-06

## 2021-12-30 RX ORDER — FAMOTIDINE 10 MG
10 TABLET ORAL 2 TIMES DAILY
DISCHARGE
Start: 2021-12-30

## 2021-12-30 RX ORDER — OXYCODONE HYDROCHLORIDE 5 MG/1
2.5 TABLET ORAL EVERY 6 HOURS PRN
Qty: 10 TABLET | Refills: 0 | Status: ON HOLD | OUTPATIENT
Start: 2021-12-30 | End: 2022-01-06

## 2021-12-30 RX ORDER — POLYETHYLENE GLYCOL 3350 17 G/17G
17 POWDER, FOR SOLUTION ORAL 2 TIMES DAILY PRN
DISCHARGE
Start: 2021-12-30

## 2021-12-30 RX ORDER — FLUTICASONE PROPIONATE 50 MCG
1 SPRAY, SUSPENSION (ML) NASAL DAILY
Status: DISCONTINUED | OUTPATIENT
Start: 2021-12-31 | End: 2021-12-31 | Stop reason: HOSPADM

## 2021-12-30 RX ORDER — POLYETHYLENE GLYCOL 3350 17 G/17G
17 POWDER, FOR SOLUTION ORAL 2 TIMES DAILY PRN
Status: DISCONTINUED | OUTPATIENT
Start: 2021-12-30 | End: 2021-12-31 | Stop reason: HOSPADM

## 2021-12-30 RX ADMIN — ACETAMINOPHEN 650 MG: 325 TABLET, FILM COATED ORAL at 19:51

## 2021-12-30 RX ADMIN — DESVENLAFAXINE SUCCINATE 50 MG: 50 TABLET, EXTENDED RELEASE ORAL at 08:42

## 2021-12-30 RX ADMIN — ENOXAPARIN SODIUM 40 MG: 40 INJECTION SUBCUTANEOUS at 15:59

## 2021-12-30 RX ADMIN — HYDROCHLOROTHIAZIDE 12.5 MG: 12.5 CAPSULE ORAL at 08:42

## 2021-12-30 RX ADMIN — DILTIAZEM HYDROCHLORIDE 240 MG: 240 CAPSULE, COATED, EXTENDED RELEASE ORAL at 08:42

## 2021-12-30 RX ADMIN — POLYETHYLENE GLYCOL 3350 17 G: 17 POWDER, FOR SOLUTION ORAL at 10:14

## 2021-12-30 RX ADMIN — FLUTICASONE PROPIONATE 1 SPRAY: 50 SPRAY, METERED NASAL at 10:14

## 2021-12-30 RX ADMIN — TRAZODONE HYDROCHLORIDE 75 MG: 50 TABLET ORAL at 22:01

## 2021-12-30 RX ADMIN — FAMOTIDINE 10 MG: 10 TABLET ORAL at 19:51

## 2021-12-30 RX ADMIN — LISINOPRIL 20 MG: 10 TABLET ORAL at 08:42

## 2021-12-30 NOTE — PROGRESS NOTES
Patient vital signs are at baseline: Yes  Patient able to ambulate as they were prior to admission or with assist devices provided by therapies during their stay:  No,  Reason:  Assist x2, platform walker and gait belt. Planning to discharge to TCU tmr  Patient MUST void prior to discharge:  Yes  Patient able to tolerate oral intake:  Yes  Pain has adequate pain control using Oral analgesics:  Yes

## 2021-12-30 NOTE — PROGRESS NOTES
"Assessment:  Right distal radius fracture  Left non-displaced distal fibula fracture  Right proximal foot pain: lis franc injury    Plan:    Keep splint applied to right UE  Non-WB through right wrist but okay to use platform walker  Elevate on 3 pillows when at rest     CAM boot on left ankle.  Ok to remove for icing and when sleeping.   WBAT Left LE in boot   Elevate and ice when at rest     Right foot:   Discussed with Dr Yu. Recommends a fusion of MT 1-3.   NWB right LE in CAM boot (ordered)  Likely will need surgical intervention in the future but this can be done in out patient setting.  She would now like this performed in [place] vs returning to [place].      Patient can discharge to TCU when bed available and follow-up with Dr Ordoñez and a foot and ankle surgeon next week for further treatment planning.            Patient resting comfortably.  Reports most pain is in her right foot.       /72 (BP Location: Left arm)   Pulse 77   Temp 98.1  F (36.7  C) (Oral)   Resp 16   Ht 1.702 m (5' 7\")   Wt 95.3 kg (210 lb)   SpO2 95%   BMI 32.89 kg/m       Exam:   Right wrist: splint intact, able to flex and extend fingers.  Sensation and pulses intact and equal bilaterally.  Brisk cap refill.  Swelling in fingers.   Left ankle: Lateral swelling and ecchymosis at the distal fibula.  TTP at the same.  Able to flex and extend toes.  Sensation and pulses intact.  No medial ankle tenderness   Right foot/ankle: ecchymosis and swelling along the dorsal right foot.  TTP along the proximal 2nd and 3rd metatarsals.  Neurovascularly intact.  No TTP along the medial or lateral ankle ligaments.  Able to flex/extend toes with some pain.    CT right foot: IMPRESSION: Fractures involving the first, second, and third proximal  metatarsals at the tarsometatarsal joint.     Peyton Yeh PA-C on 12/30/2021 at 9:09 AM      "

## 2021-12-30 NOTE — PROGRESS NOTES
[facility]  Hospitalist Progress Note        Magdiel Wesley MD   12/30/2021        Interval History:        - Working with PT; has CAM boots in both LE; right wrist in splint  - SW following for disposition, TCU planned for 12/31  - ortho recommending fusion of metatarsal fractures (patient plans to have it done in [place] prior to returning to [place]); plan to follow up with orthopedics in 1 week          Assessment and Plan:        Tresa Combs is a 66 year old female with PMH significant for HTN admitted on 12/28/2021 with a mechanical fall resulting in right radius fracture and Left lateral malleolus fracture. Later imaging also noted with fracture of the third proximal metatarsal with slight displacement and distraction.     She is visiting from [place], was staying with her daughter and reports that she got up to use the restroom and fell down 4 steps.    On admission,   * XR of the R forearm shows a displaced, comminuted fracture of the distal right radius with significant dorsal angulation on the lateral image  *XR of the L ankle shows Severe soft tissue swelling laterally with minimally displaced oblique fracture of the lateral malleolus.   * Her radial fracture was reduced and splinted in the ED     Mechanical Fall  R radius Fracture   Rt foot metatarsal fractures  L lateral malleolus fracture  - xray imagings noted with fractures as noted above; admitted under observation status  - CT right foot 12/29 noted with fractures involving the first, second, and third proximal  metatarsals at the tarsometatarsal joint  - evaluated by ortho, rec NWB RLE in CAM boot; ortho recommending fusion of metatarsal fractures (patient plans to have it done in [place] prior to returning to [place]); plan to follow up with orthopedics in 1 week   - WBAT left LE in CAM boot  - to keep splint applied to Rt wrist, non-WB through right wrist but okay to use platform walker  - evaluated by PT-- rec TCU; SW for  "disposition planning, planned for TCU 12/31    HTN  - continue PTA diltiazem , hydrochlorothiazide , lisinopril     Depression   - continue PTA Desvenlafaxine, trazodone    GERD: continue pepcid    Nasal congestion: will schedule daily flonase     Obesity  Body mass index is 32.89 kg/m     Orders Placed This Encounter      Regular Diet Adult    DVT Prophylaxis: continue Lovenox    Code Status: Full Code         Disposition Plan     Expected Discharge: cleared by ortho for TCU; planned for 12/31      Clinically Significant Risk Factors Present on Admission                # Obesity: last Body mass index is 32.89 kg/m .       Page Me (7 am to 6 pm)              Physical Exam:      Blood pressure 112/60, pulse 83, temperature 98.2  F (36.8  C), temperature source Oral, resp. rate 16, height 1.702 m (5' 7\"), weight 95.3 kg (210 lb), SpO2 94 %.  Vitals:    12/28/21 0137   Weight: 95.3 kg (210 lb)     Vital Signs with Ranges  Temp:  [97.8  F (36.6  C)-98.5  F (36.9  C)] 98.2  F (36.8  C)  Pulse:  [] 83  Resp:  [16-18] 16  BP: (103-136)/(52-68) 112/60  SpO2:  [92 %-96 %] 94 %  I/O's Last 24 hours  I/O last 3 completed shifts:  In: 840 [P.O.:840]  Out: 3600 [Urine:3600]    Constitutional: Alert, awake and oriented X 3; resting comfortably in no apparent distress   HEENT: Pupils equal and reactive to light and accomodation, neck supple    Oral cavity: Moist mucosa   Cardiovascular: Normal s1 s2, regular rate and rhythm, no murmur   Lungs: B/l clear to auscultation, no wheezes or crepitations   Abdomen: Soft, nt, nd, no guarding, rigidity or rebound; BS +   LE : Right wrist in splint; b/l LE in CAM boot   Musculoskeletal: Power 5/5 in all extremities   Neuro: No focal neurological deficits noted   Psychiatry: normal mood and affect                Medications:          desvenlafaxine  50 mg Oral Daily     diltiazem ER COATED BEADS  240 mg Oral Daily     enoxaparin ANTICOAGULANT  40 mg Subcutaneous Q24H     famotidine  10 " mg Oral BID     hydrochlorothiazide  12.5 mg Oral Daily     lisinopril  20 mg Oral Daily     traZODone  75 mg Oral At Bedtime     PRN Meds: acetaminophen **OR** acetaminophen, fluticasone, HYDROmorphone, melatonin, naloxone **OR** naloxone **OR** naloxone **OR** naloxone, ondansetron **OR** ondansetron, oxyCODONE         Data:      All new lab and imaging data was reviewed.   Recent Labs   Lab Test 12/28/21  0718   WBC 10.0   HGB 13.3   MCV 95         Recent Labs   Lab Test 12/29/21  1544 12/28/21  0718   NA  --  138   POTASSIUM  --  3.8   CHLORIDE  --  105   CO2  --  31   BUN  --  17   CR 0.73 0.71   ANIONGAP  --  2*   AVELINO  --  9.2   GLC  --  127*     No lab results found.    Invalid input(s): TROP, TROPONINIES

## 2021-12-30 NOTE — PLAN OF CARE
Patient alert and oriented x4. VSS on RA. C/o minimal pain at rest, PRNs available. Ice and elevating. CMS intact all extremities. Up w/ assist x2, platform walker in room. LLE WBAT. RLE NWB (in boot), RUE NWB (wrapped, splint). Does have CAM boot for LLE when OOB. Ortho following, cleared to discharge. Bed available at [facility] tomorrow, [facility] transport set up for 12:30pm. Will follow up w/ ortho outpatient regarding surgery to right foot. SW following.

## 2021-12-30 NOTE — PLAN OF CARE
A/Ox4. VSS on RA. Declined need for pain medications or ice packs. Capillary refills intact. RUE is NWB in splint, elevated on pillows. RLE is NWB in boot, elevated on pillows. LLE is WBAT in boot, elevated on pillows. Up Ax2 pivot to commode. Purewick in place overnight. Powder applied to rash in groin area. Regular diet. L PIV S/L'd. discharge pending TCU placement. SW following

## 2021-12-30 NOTE — PROGRESS NOTES
Observation goals  PRIOR TO DISCHARGE       Comments:   -diagnostic tests and consults completed and resulted met  -vital signs normal or at patient baseline met  -returns to baseline functional status not met  -safe disposition plan has been identified not met, pending placement SW following

## 2021-12-30 NOTE — PROGRESS NOTES
Care Management Follow Up    Length of Stay (days): 2    Expected Discharge Date: 12/30/2021     Concerns to be Addressed:     discharge needs  Patient plan of care discussed at interdisciplinary rounds: Yes    Anticipated Discharge Disposition:  TCU     Anticipated Discharge Services:    Anticipated Discharge DME:      Patient/family educated on Medicare website which has current facility and service quality ratings:  yes  Education Provided on the Discharge Plan:  yes  Patient/Family in Agreement with the Plan:  yes    Referrals Placed by CM/SW:  TCU  Private pay costs discussed: private room/amenity fees, transportation costs and insurance costs out of pocket expenses and co-pays    Additional Information:  SW left 's for pending TCU referrals, return calls pending.    Addendum:   1312:  Patient accepted at [facility] for admission Fri 12/31. Transport scheduled at 1230 via [facility].  DIANNA urban MD with discharge plan.    DIANNA to continue to follow and assist with discharge planning.    AJ Guerrero  Daytime (8:00am-4:30pm): [phone]  After-Hours DIANNA Pager (4:30pm-11:30pm): [phone]           AJ Bejarano

## 2021-12-30 NOTE — PLAN OF CARE
[id number] shift update:   A&Ox4. VSS on RA. Denies chest pain and SOB. RUE casted, Non-WB. CAM boots to BLE. WBAT to LLE with CAM boot and Non-WB to RLE. CMS intact. Able to wiggle toes and left fingers. Extremities elevated in bed. Pain 4/10, declined pain meds. Up Ax2 BSC. Using purewick. Tolerating reg diet. Discharge to TCU pending progress.

## 2021-12-30 NOTE — PROGRESS NOTES
Patient vital signs are at baseline: Yes  Patient able to ambulate as they were prior to admission or with assist devices provided by therapies during their stay:  No,  Reason:  Moving with assist x2, W/GB   Patient MUST void prior to discharge:  Yes  Patient able to tolerate oral intake:  Yes  Pain has adequate pain control using Oral analgesics:  Yes

## 2021-12-31 VITALS
HEART RATE: 89 BPM | RESPIRATION RATE: 18 BRPM | WEIGHT: 210 LBS | OXYGEN SATURATION: 94 % | SYSTOLIC BLOOD PRESSURE: 117 MMHG | HEIGHT: 67 IN | DIASTOLIC BLOOD PRESSURE: 58 MMHG | BODY MASS INDEX: 32.96 KG/M2 | TEMPERATURE: 98.3 F

## 2021-12-31 PROCEDURE — 250N000013 HC RX MED GY IP 250 OP 250 PS 637: Performed by: STUDENT IN AN ORGANIZED HEALTH CARE EDUCATION/TRAINING PROGRAM

## 2021-12-31 PROCEDURE — 250N000013 HC RX MED GY IP 250 OP 250 PS 637: Performed by: HOSPITALIST

## 2021-12-31 PROCEDURE — G0378 HOSPITAL OBSERVATION PER HR: HCPCS

## 2021-12-31 PROCEDURE — 99217 PR OBSERVATION CARE DISCHARGE: CPT | Performed by: HOSPITALIST

## 2021-12-31 RX ADMIN — HYDROCHLOROTHIAZIDE 12.5 MG: 12.5 CAPSULE ORAL at 07:52

## 2021-12-31 RX ADMIN — DESVENLAFAXINE SUCCINATE 50 MG: 50 TABLET, EXTENDED RELEASE ORAL at 07:52

## 2021-12-31 RX ADMIN — ACETAMINOPHEN 650 MG: 325 TABLET, FILM COATED ORAL at 05:00

## 2021-12-31 RX ADMIN — DILTIAZEM HYDROCHLORIDE 240 MG: 240 CAPSULE, COATED, EXTENDED RELEASE ORAL at 07:52

## 2021-12-31 RX ADMIN — Medication 1 SPRAY: at 07:59

## 2021-12-31 RX ADMIN — POLYETHYLENE GLYCOL 3350 17 G: 17 POWDER, FOR SOLUTION ORAL at 07:59

## 2021-12-31 RX ADMIN — FAMOTIDINE 10 MG: 10 TABLET ORAL at 07:52

## 2021-12-31 RX ADMIN — LISINOPRIL 20 MG: 10 TABLET ORAL at 07:52

## 2021-12-31 NOTE — PROGRESS NOTES
Patient seen and examined    -No acute issues overnight; pain adequately controlled, reported some knee pain-- no swelling or bony tenderness  -plan for discharge to TCU today    Discharge to TCU ([facility]) today    Please see discharge summary for details

## 2021-12-31 NOTE — PLAN OF CARE
Observation goals  PRIOR TO DISCHARGE        Comments: -diagnostic tests and consults completed and resulted met  -vital signs normal or at patient baseline met  -returns to baseline functional status not met  -safe disposition plan has been identified met  Nurse to notify provider when observation goals have been met and patient is ready for discharge.

## 2021-12-31 NOTE — PROGRESS NOTES
-Patient vital signs are at baseline: Yes    -Patient able to ambulate as they were prior to admission or with assist: Yes     -Patient MUST void prior to discharge:  Yes    -Patient able to tolerate oral intake:  Yes    -Pain has adequate pain control using Oral analgesics:  Yes    A/O x4. VSS on RA. C/o RUE, BLE pain, ice and prn Tylenol given. Up with Ax2/ platform walker. RLE boot, R arm splint and wrap. LLE when OOB. PIV SL. CMS intact. Plan: discharge to TCU today.

## 2021-12-31 NOTE — DISCHARGE SUMMARY
[facility]  Discharge Summary        Tresa Combs MRN# [id number]   YOB: 1955 Age: 66 year old     Date of Admission:  12/28/2021  Date of Discharge:  12/31/2021  Admitting Physician:  Katina Figueroa  Discharge Physician: Magdiel Wesley MD  Discharging Service: Hospitalist     Primary Provider: System, Provider Not In  Primary Care Physician Phone Number: None         Discharge Diagnoses/Problem Oriented Hospital Course (Providers):    Tresa Combs was admitted on 12/28/2021 by Katina Figueroa and I would refer you to their history and physical.  The following problems were addressed during her hospitalization:    Tresa Combs is a 66 year old female with PMH significant for HTN admitted on 12/28/2021 with a mechanical fall resulting in right radius fracture and Left lateral malleolus fracture. Later imaging also noted with fracture of the third proximal metatarsal with slight displacement and distraction.      She is visiting from [place], was staying with her daughter and reports that she got up to use the restroom and fell down 4 steps.     On admission,   * XR of the R forearm shows a displaced, comminuted fracture of the distal right radius with significant dorsal angulation on the lateral image  *XR of the L ankle shows Severe soft tissue swelling laterally with minimally displaced oblique fracture of the lateral malleolus.   * Her radial fracture was reduced and splinted in the ED     Mechanical Fall  R radius Fracture   Rt foot metatarsal fractures  L lateral malleolus fracture  - xray imagings noted with fractures as noted above; admitted under observation status  - CT right foot 12/29 noted with fractures involving the first, second, and third proximal  metatarsals at the tarsometatarsal joint  - evaluated by ortho, rec NWB RLE in CAM boot; ortho recommending fusion of metatarsal fractures (patient plans to have it done in [place] prior to returning to [place]);  plan to follow up with orthopedics next week   - WBAT left LE in CAM boot  - to keep splint applied to Rt wrist, non-WB through right wrist but okay to use platform walker  - evaluated by PT-- rec TCU; SW for disposition planning, planned for TCU ([place] [facility])     HTN  - continue PTA diltiazem , hydrochlorothiazide , lisinopril      Depression   - continue PTA Desvenlafaxine, trazodone     GERD: continue pepcid     Nasal congestion: continue daily flonase     Obesity  Body mass index is 32.89 kg/m       Code Status: Full Code          Pending Results:        Unresulted Labs Ordered in the Past 30 Days of this Admission     No orders found from 11/28/2021 to 12/29/2021.               Discharge Instructions and Follow-Up:      Follow-up Appointments     Follow Up and recommended labs and tests      Follow up with Dr. Ordoñez for right wrist splint change   Follow-up with Dr Yu or Dr Cunningham if possible on the same date to   discuss treatment plan for right foot.    [facility]: [phone]  [facility]: [phone]                  Discharge Disposition:      Discharged to short-term care facility         Discharge Medications:        Discharge Medication List as of 12/31/2021 12:18 PM      START taking these medications    Details   acetaminophen (TYLENOL) 325 MG tablet Take 2 tablets (650 mg) by mouth every 6 hours as needed for mild pain or other (and adjunct with moderate or severe pain or per patient request), Transitional      famotidine (PEPCID) 10 MG tablet Take 1 tablet (10 mg) by mouth 2 times daily, Transitional      polyethylene glycol (MIRALAX) 17 g packet Take 17 g by mouth 2 times daily as needed (constipation), Transitional         CONTINUE these medications which have CHANGED    Details   oxyCODONE (ROXICODONE) 5 MG tablet Take 0.5 tablets (2.5 mg) by mouth every 6 hours as needed for moderate to severe pain, Disp-10 tablet, R-0, Local Print         CONTINUE these medications which  have NOT CHANGED    Details   CALCIUM PO Take 400 mg by mouth daily, Historical      clotrimazole-betamethasone (LOTRISONE) 1-0.05 % external cream Apply topically 2 times daily as neededHistorical      desvenlafaxine (PRISTIQ) 50 MG 24 hr tablet Take 50 mg by mouth daily, Historical      diltiazem ER (TIAZAC) 240 MG 24 hr ER beaded capsule Take 240 mg by mouth daily, Historical      famotidine-Ca Carb-mag hydrox (PEPCID COMPLETE) -165 MG CHEW Take 1-2 tablets by mouth daily as needed, Historical      fluticasone (FLONASE) 50 mcg/actuation nasal spray [FLUTICASONE (FLONASE) 50 MCG/ACTUATION NASAL SPRAY] 1 spray into each nostril once daily., Historical      hydrochlorothiazide (MICROZIDE) 12.5 MG capsule Take 12.5 mg by mouth daily, Historical      ketoconazole (NIZORAL) 2 % external cream Apply topically daily Apply to affected area under breasts twice daily for 14 days for flaresHistorical      lisinopril (ZESTRIL) 20 MG tablet Take 20 mg by mouth daily, Historical      MAGNESIUM PO Take 2 capsules by mouth daily Ortho molecular brand reacted magnesium 235 mg per capsule, Historical      multivitamin w/minerals (THERA-VIT-M) tablet Take 1 tablet by mouth daily, Historical      mupirocin (BACTROBAN) 2 % external ointment Apply topically 2 times dailyHistorical      nitroglycerin (NITROSTAT) 0.4 MG SL tablet Place 0.4 mg under the tongue every 5 minutes as needed , Historical      traZODone (DESYREL) 50 MG tablet Take 75 mg by mouth At Bedtime, Historical      vitamin D3 (CHOLECALCIFEROL) 50 mcg (2000 units) tablet Take 1 tablet by mouth daily, Historical                  Allergies:         Allergies   Allergen Reactions     Ceclor [Cefaclor] Rash     Tramadol Rash            Consultations This Hospital Stay:      Consultation during this admission received from orthopedics         Condition and Physical Exam on Discharge:      Discharge condition: Stable   Discharge vitals: Blood pressure 117/58, pulse 89,  "temperature 98.3  F (36.8  C), temperature source Oral, resp. rate 18, height 1.702 m (5' 7\"), weight 95.3 kg (210 lb), SpO2 94 %.     Constitutional: Alert, awake and oriented X 3; resting comfortably in no apparent distress   HEENT: Pupils equal and reactive to light and accomodation, neck supple    Oral cavity: Moist mucosa   Cardiovascular: Normal s1 s2, regular rate and rhythm, no murmur   Lungs: B/l clear to auscultation, no wheezes or crepitations   Abdomen: Soft, nt, nd, no guarding, rigidity or rebound; BS +   LE : Right wrist in splint; b/l LE in CAM boot; left knee with no bruise,  swelling or bony tenderness   Musculoskeletal: Power 5/5 in all extremities   Neuro: No focal neurological deficits noted   Psychiatry: normal mood and affect                 Discharge Orders for Skilled Facility (from Discharge Orders):        After Care Instructions     Activity - Up with assistive device      Up with walker/assist         Additional Discharge Instructions      CAM boot bilateral Lower extremities  Ok to remove boots to ice   Keep splint on right wrist         Fall precautions      General info for SNF      Length of Stay Estimate: Short Term Care: Estimated # of Days <30  Condition at Discharge: Improving  Level of care:skilled   Rehabilitation Potential: Good  Admission H&P remains valid and up-to-date: Yes  Recent Chemotherapy: N/A  Use Nursing Home Standing Orders: Yes         Mantoux instructions      Give two-step Mantoux (PPD) Per Facility Policy Yes         Weight bearing status      WBAT Left LE in boot  Non-WB Right LE in boot  Non-WB Right wrist but ok to use platform walker                    Rehab orders for Skilled Facility (from Discharge Orders):               Discharge Time:      Greater than 30 minutes.        Image Results From This Hospital Stay (For Non-EPIC Providers):        Results for orders placed or performed during the hospital encounter of 12/28/21   Radius/Ulna XR, PA & LAT, " right    Narrative    EXAM: XR FOREARM RIGHT 2 VIEWS  LOCATION: [facility]  DATE/TIME: 12/28/2021 2:06 AM    INDICATION: Pain.  COMPARISON: Wrist radiographs performed concurrently.      Impression    IMPRESSION: Redemonstrated dorsally displaced and angulated distal right radius fracture. The proximal radius and ulna are intact.   XR Wrist Right 2 Views    Narrative    EXAM: XR WRIST RIGHT 2 VIEW  LOCATION: [facility]  DATE/TIME: 12/28/2021 2:06 AM    INDICATION: Injury and pain.  COMPARISON: None.      Impression    IMPRESSION: There is a displaced, comminuted fracture of the distal right radius with significant dorsal angulation on the lateral image. Well-corticated density adjacent to the distal ulna may reflect an old injury. There is no acute, displaced ulnar   fracture. Soft tissue swelling.   XR Hand Right G/E 3 Views    Narrative    EXAM: XR HAND RT G/E 3 VW  LOCATION: [facility]  DATE/TIME: 12/28/2021 2:06 AM    INDICATION: Pain.  COMPARISON: Wrist radiographs performed concurrently.      Impression    IMPRESSION: Redemonstrated dorsally angulated and displaced fracture of the distal right radius. No acute, displaced hand fracture.   XR Ankle Bilateral G/E 3 Views    Narrative    EXAM: XR ANKLE BILATERAL G/E 3 VIEWS  LOCATION: [facility]  DATE/TIME: 12/28/2021 2:06 AM    INDICATION: Injury and pain.  COMPARISON: None.      Impression    IMPRESSION:   RIGHT ANKLE: No displaced fracture or dislocation. Normal alignment of the ankle mortise on these nonstress views.    LEFT ANKLE: Severe soft tissue swelling laterally with minimally displaced oblique fracture of the lateral malleolus. The ankle mortise is well aligned on these nonstress views. No additional fracture visualized.   XR Wrist Right 2 Views    Narrative    EXAM: XR WRIST RIGHT 2 VIEW  LOCATION: [facility]  DATE/TIME:  12/28/2021 4:35 AM    INDICATION: Post reduction images of an impacted right wrist fracture.  COMPARISON: 12/28/2021      Impression    IMPRESSION: Interval decrease in angulation and displaced fracture involving the distal right radius. There remains very minimal dorsal displacement of fracture fragments. No evidence for acute displaced fracture involving the right ulna or the carpal   bones. Overlying cast material now present.   XR Ankle Left 2 Views    Narrative    ANKLE LEFT TWO VIEW   12/28/2021 3:06 PM     HISTORY: Nondisplaced lateral malleolus fracture, confirm stability on  gravity stress.    COMPARISON: Earlier today.    FINDINGS: Lateral soft tissue swelling.      Impression    IMPRESSION: Distal fibular fracture with 1 mm of displacement.    BELLE ACUÑA MD         SYSTEM ID:  [id number]   XR Foot Right G/E 3 Views    Narrative    FOOT RIGHT THREE OR MORE VIEWS   12/28/2021 3:05 PM     HISTORY: Right foot pain, evaluate proximal metatarsals for fracture.    FINDINGS: Mild calcaneal spurring.      Impression    IMPRESSION:   1. Fracture of the third proximal metatarsal with slight displacement  and distraction.  2. There is asymmetry and slight subluxation at the second TMT joint  (presumed Lisfranc ligament injury).    BELLE ACUÑA MD         SYSTEM ID:  [id number]   CT Foot Right w/o Contrast    Narrative    CT RIGHT FOOT WITHOUT CONTRAST  12/29/2021 9:26 AM     HISTORY: Evaluate fractures in a patient with Lisfranc injury.    TECHNIQUE: Axial scans were performed through the right foot with  sagittal and coronal reconstruction. Radiation dose for this scan was  reduced using automated exposure control, adjustment of the mA and/or  kV according to patient size, or iterative reconstruction technique.    COMPARISON: X-ray from 12/28/2021.    FINDINGS: Nondisplaced horizontally-oriented intra-articular fracture  inferior proximal corner of the first metatarsal at the first  tarsometatarsal joint.    Mildly  displaced, mildly comminuted fracture proximal second  metatarsal inferiorly and medially. Mild widening of the dorsal second  tarsometatarsal joint space.    Transverse mildly comminuted fracture proximal third metatarsal with  mild intra-articular extension.    No fourth or fifth metatarsal fractures. No definite cuneiform  fractures. No cuboid fracture. No other fractures identified.      Impression    IMPRESSION: Fractures involving the first, second, and third proximal  metatarsals at the tarsometatarsal joint. See above.    GAVIOTA GALARZA MD         SYSTEM ID:  [id number]           Most Recent Lab Results In EPIC (For Non-EPIC Providers):    Most Recent 3 CBC's:  Recent Labs   Lab Test 12/28/21  0718   WBC 10.0   HGB 13.3   MCV 95         Most Recent 3 BMP's:  Recent Labs   Lab Test 12/29/21  1544 12/28/21  0718   NA  --  138   POTASSIUM  --  3.8   CHLORIDE  --  105   CO2  --  31   BUN  --  17   CR 0.73 0.71   ANIONGAP  --  2*   AVELINO  --  9.2   GLC  --  127*     Most Recent 3 Troponin's:No lab results found.    Invalid input(s): TROP, TROPONINIES  Most Recent 3 INR's:No lab results found.  Most Recent 2 LFT's:No lab results found.  Most Recent Cholesterol Panel:  Recent Labs   Lab Test 05/14/14  0812   CHOL 194      HDL 62   TRIG 67     Most Recent 6 Bacteria Isolates From Any Culture (See EPIC Reports for Culture Details):No lab results found.  Most Recent TSH, T4 and HgbA1c:No lab results found.

## 2021-12-31 NOTE — PROGRESS NOTES
Care Management Discharge Note    Discharge Date: 12/31/2021  Expected Time of Departure: 1230    Discharge Disposition:  TCU- [facility]    Discharge Services:      Discharge DME:      Discharge Transportation: [facility]    Private pay costs discussed: transportation costs and insurance costs out of pocket expenses and co-pays    PAS Confirmation Code:    Patient/family educated on Medicare website which has current facility and service quality ratings:      Education Provided on the Discharge Plan:    Persons Notified of Discharge Plans: pt, medical team, TCU  Patient/Family in Agreement with the Plan: yes    Handoff Referral Completed: No    Additional Information:  DC orders: sent via DOD at 1019  Scripts:  PAS: LATE ENTRY- COMPLETED AND FAXED TO TCU  Trans: 1230    PAS-RR    D: Per DHS regulation, DIANNA completed and submitted PAS-RR to [place] [employer] Direct Connect via the Senior LinkAge Line.  PAS-RR confirmation # is : [id number]    I: SW spoke with pt and they are aware a PAS-RR has been submitted.  SW reviewed with pt that they may be contacted for a follow up appointment within 10 days of hospital discharge if their SNF stay is < 30 days.  Contact information for Senior LinkAge Line was also provided.    A: Pt verbalized understanding.    P: Further questions may be directed to Senior LinkAge Line at #[phone], option #4 for PAS-RR staff.      SW to continue to follow and assist with discharge planning.    AJ Guerrero  Daytime (8:00am-4:30pm): [phone]  After-Hours SW Pager (4:30pm-11:30pm): [phone]               AJ Bejarano

## 2021-12-31 NOTE — PROGRESS NOTES
-Patient vital signs are at baseline: Yes    -Patient able to ambulate as they were prior to admission or with assist: Yes     -Patient MUST void prior to discharge:  Yes    -Patient able to tolerate oral intake:  Yes    -Pain has adequate pain control using Oral analgesics:  Yes

## 2021-12-31 NOTE — PROGRESS NOTES
"Tresa Combs  2021\  Right distal radius fracture  Left non displaced distal fibular fracture  Right lis franc injury     Sitting up in bed resting comfortably. Now complaining of left knee pain. States that her pain started last night without new injury.   Blood pressure 117/58, pulse 89, temperature 98.3  F (36.8  C), temperature source Oral, resp. rate 18, height 1.702 m (5' 7\"), weight 95.3 kg (210 lb), SpO2 94 %.  Temperatures:  Current - Temp: 98.3  F (36.8  C); Max - Temp  Av.2  F (36.8  C)  Min: 97.5  F (36.4  C)  Max: 98.6  F (37  C)  Pulse range: Pulse  Av.7  Min: 79  Max: 99  Blood pressure range: Systolic (24hrs), Av , Min:114 , Max:134   ; Diastolic (24hrs), Av, Min:55, Max:73    CMS: left ankle TTP to lateral aspect, pain with dorsiflexion and plantar flexion, NVI intact, ecchymosis to lateral aspect with obvious sweelling.   Left knee: no effusion, medial joint line tenderness, able to flex and extend but reports some pain, stable to varus and valgus stress  Right UE: splint in place, good cap refill, able to wiggle fingers  Right LE: boot is in place, able to wiggle toes, NVI   Labs:   Hemoglobin   Date Value Ref Range Status   2021 13.3 11.7 - 15.7 g/dL Final   ]  No results found for: INR  Lab Results   Component Value Date     2021       PLAN:  Keep split on RUE, non WB through wrist with platform walker, follow up Tuesday with Dr. Ordoñez per patient.   Right foot: has appointment scheduled with Dr. Cunningham Monday. NWB until Follow-up  Left ankle: WBAT with boot on. Elevate and ice  Left knee: no effusion and stable on exam, advised patient to follow up outpatient in regards to knee pain.   "

## 2021-12-31 NOTE — PLAN OF CARE
Patient has been discharged December 31, 2021 12:44 PM to TCU. [facility] for transport. All belongings collected prior to discharge.

## 2022-01-03 DIAGNOSIS — Z11.59 ENCOUNTER FOR SCREENING FOR OTHER VIRAL DISEASES: ICD-10-CM

## 2022-01-04 NOTE — PLAN OF CARE
Physical Therapy Discharge Summary    Reason for therapy discharge:    Discharged to transitional care facility.    Progress towards therapy goal(s). See goals on Care Plan in [facility] electronic health record for goal details.  Goals not met.  Barriers to achieving goals:   discharge from facility.    Therapy recommendation(s):    Continued therapy is recommended.  Rationale/Recommendations:  Recommend continued skilled PT at TCU in order to progress pt functional mobility prior to returning home..

## 2022-01-05 ENCOUNTER — ANESTHESIA EVENT (OUTPATIENT)
Dept: SURGERY | Facility: CLINIC | Age: 67
End: 2022-01-05
Payer: MEDICARE

## 2022-01-06 ENCOUNTER — APPOINTMENT (OUTPATIENT)
Dept: GENERAL RADIOLOGY | Facility: CLINIC | Age: 67
End: 2022-01-06
Attending: ORTHOPAEDIC SURGERY
Payer: MEDICARE

## 2022-01-06 ENCOUNTER — ANESTHESIA (OUTPATIENT)
Dept: SURGERY | Facility: CLINIC | Age: 67
End: 2022-01-06
Payer: MEDICARE

## 2022-01-06 ENCOUNTER — HOSPITAL ENCOUNTER (OUTPATIENT)
Facility: CLINIC | Age: 67
Discharge: HOME | End: 2022-01-06
Attending: ORTHOPAEDIC SURGERY | Admitting: ORTHOPAEDIC SURGERY
Payer: MEDICARE

## 2022-01-06 VITALS
HEART RATE: 88 BPM | DIASTOLIC BLOOD PRESSURE: 62 MMHG | OXYGEN SATURATION: 95 % | HEIGHT: 67 IN | WEIGHT: 210 LBS | TEMPERATURE: 97.6 F | RESPIRATION RATE: 11 BRPM | BODY MASS INDEX: 32.96 KG/M2 | SYSTOLIC BLOOD PRESSURE: 130 MMHG

## 2022-01-06 DIAGNOSIS — S93.324S LISFRANC DISLOCATION, RIGHT, SEQUELA: Primary | ICD-10-CM

## 2022-01-06 DIAGNOSIS — S93.409A SPRAIN OF ANKLE, UNSPECIFIED LATERALITY, UNSPECIFIED LIGAMENT, INITIAL ENCOUNTER: ICD-10-CM

## 2022-01-06 LAB
CREAT SERPL-MCNC: 0.72 MG/DL (ref 0.52–1.04)
GFR SERPL CREATININE-BSD FRML MDRD: >90 ML/MIN/1.73M2
HGB BLD-MCNC: 13.4 G/DL (ref 11.7–15.7)
POTASSIUM BLD-SCNC: 4 MMOL/L (ref 3.4–5.3)
SARS-COV-2 RNA RESP QL NAA+PROBE: NEGATIVE

## 2022-01-06 PROCEDURE — 258N000003 HC RX IP 258 OP 636: Performed by: ANESTHESIOLOGY

## 2022-01-06 PROCEDURE — 82565 ASSAY OF CREATININE: CPT | Performed by: ANESTHESIOLOGY

## 2022-01-06 PROCEDURE — 272N000001 HC OR GENERAL SUPPLY STERILE: Performed by: ORTHOPAEDIC SURGERY

## 2022-01-06 PROCEDURE — 36415 COLL VENOUS BLD VENIPUNCTURE: CPT | Performed by: ANESTHESIOLOGY

## 2022-01-06 PROCEDURE — 250N000009 HC RX 250: Performed by: NURSE ANESTHETIST, CERTIFIED REGISTERED

## 2022-01-06 PROCEDURE — 370N000017 HC ANESTHESIA TECHNICAL FEE, PER MIN: Performed by: ORTHOPAEDIC SURGERY

## 2022-01-06 PROCEDURE — 360N000084 HC SURGERY LEVEL 4 W/ FLUORO, PER MIN: Performed by: ORTHOPAEDIC SURGERY

## 2022-01-06 PROCEDURE — 250N000025 HC SEVOFLURANE, PER MIN: Performed by: ORTHOPAEDIC SURGERY

## 2022-01-06 PROCEDURE — 258N000003 HC RX IP 258 OP 636: Performed by: NURSE ANESTHETIST, CERTIFIED REGISTERED

## 2022-01-06 PROCEDURE — 250N000011 HC RX IP 250 OP 636: Performed by: ANESTHESIOLOGY

## 2022-01-06 PROCEDURE — 87635 SARS-COV-2 COVID-19 AMP PRB: CPT | Performed by: ANESTHESIOLOGY

## 2022-01-06 PROCEDURE — 710N000009 HC RECOVERY PHASE 1, LEVEL 1, PER MIN: Performed by: ORTHOPAEDIC SURGERY

## 2022-01-06 PROCEDURE — C1713 ANCHOR/SCREW BN/BN,TIS/BN: HCPCS | Performed by: ORTHOPAEDIC SURGERY

## 2022-01-06 PROCEDURE — 85018 HEMOGLOBIN: CPT | Performed by: ANESTHESIOLOGY

## 2022-01-06 PROCEDURE — 250N000011 HC RX IP 250 OP 636: Performed by: NURSE ANESTHETIST, CERTIFIED REGISTERED

## 2022-01-06 PROCEDURE — 710N000012 HC RECOVERY PHASE 2, PER MINUTE: Performed by: ORTHOPAEDIC SURGERY

## 2022-01-06 PROCEDURE — 250N000009 HC RX 250: Performed by: ORTHOPAEDIC SURGERY

## 2022-01-06 PROCEDURE — 999N000141 HC STATISTIC PRE-PROCEDURE NURSING ASSESSMENT: Performed by: ORTHOPAEDIC SURGERY

## 2022-01-06 PROCEDURE — 84132 ASSAY OF SERUM POTASSIUM: CPT | Performed by: ANESTHESIOLOGY

## 2022-01-06 PROCEDURE — 999N000179 XR SURGERY CARM FLUORO LESS THAN 5 MIN W STILLS

## 2022-01-06 PROCEDURE — 271N000001 HC OR GENERAL SUPPLY NON-STERILE: Performed by: ORTHOPAEDIC SURGERY

## 2022-01-06 PROCEDURE — 250N000011 HC RX IP 250 OP 636: Performed by: PHYSICIAN ASSISTANT

## 2022-01-06 DEVICE — IMPLANTABLE DEVICE: Type: IMPLANTABLE DEVICE | Site: ANKLE | Status: FUNCTIONAL

## 2022-01-06 RX ORDER — AMOXICILLIN 250 MG
1-2 CAPSULE ORAL 2 TIMES DAILY
Qty: 10 TABLET | Refills: 0 | DISCHARGE
Start: 2022-01-06

## 2022-01-06 RX ORDER — ONDANSETRON 4 MG/1
4 TABLET, ORALLY DISINTEGRATING ORAL
Status: DISCONTINUED | OUTPATIENT
Start: 2022-01-06 | End: 2022-01-06 | Stop reason: HOSPADM

## 2022-01-06 RX ORDER — CLINDAMYCIN PHOSPHATE 900 MG/50ML
900 INJECTION, SOLUTION INTRAVENOUS SEE ADMIN INSTRUCTIONS
Status: DISCONTINUED | OUTPATIENT
Start: 2022-01-06 | End: 2022-01-06 | Stop reason: HOSPADM

## 2022-01-06 RX ORDER — OXYCODONE HYDROCHLORIDE 5 MG/1
5 TABLET ORAL EVERY 4 HOURS PRN
Status: DISCONTINUED | OUTPATIENT
Start: 2022-01-06 | End: 2022-01-06 | Stop reason: HOSPADM

## 2022-01-06 RX ORDER — ONDANSETRON 2 MG/ML
INJECTION INTRAMUSCULAR; INTRAVENOUS PRN
Status: DISCONTINUED | OUTPATIENT
Start: 2022-01-06 | End: 2022-01-06

## 2022-01-06 RX ORDER — ACETAMINOPHEN 500 MG
1000 TABLET ORAL EVERY 6 HOURS PRN
COMMUNITY
Start: 2022-01-06 | End: 2022-01-06

## 2022-01-06 RX ORDER — PROPOFOL 10 MG/ML
INJECTION, EMULSION INTRAVENOUS PRN
Status: DISCONTINUED | OUTPATIENT
Start: 2022-01-06 | End: 2022-01-06

## 2022-01-06 RX ORDER — CLINDAMYCIN PHOSPHATE 900 MG/50ML
900 INJECTION, SOLUTION INTRAVENOUS
Status: COMPLETED | OUTPATIENT
Start: 2022-01-06 | End: 2022-01-06

## 2022-01-06 RX ORDER — HYDROXYZINE HYDROCHLORIDE 10 MG/1
10 TABLET, FILM COATED ORAL
Status: DISCONTINUED | OUTPATIENT
Start: 2022-01-06 | End: 2022-01-06 | Stop reason: HOSPADM

## 2022-01-06 RX ORDER — ACETAMINOPHEN 325 MG/1
650 TABLET ORAL
Status: DISCONTINUED | OUTPATIENT
Start: 2022-01-06 | End: 2022-01-06 | Stop reason: HOSPADM

## 2022-01-06 RX ORDER — OXYCODONE HYDROCHLORIDE 5 MG/1
5 TABLET ORAL
Status: DISCONTINUED | OUTPATIENT
Start: 2022-01-06 | End: 2022-01-06 | Stop reason: HOSPADM

## 2022-01-06 RX ORDER — SODIUM CHLORIDE, SODIUM LACTATE, POTASSIUM CHLORIDE, CALCIUM CHLORIDE 600; 310; 30; 20 MG/100ML; MG/100ML; MG/100ML; MG/100ML
INJECTION, SOLUTION INTRAVENOUS CONTINUOUS
Status: DISCONTINUED | OUTPATIENT
Start: 2022-01-06 | End: 2022-01-06 | Stop reason: HOSPADM

## 2022-01-06 RX ORDER — AMOXICILLIN 250 MG
1-2 CAPSULE ORAL 2 TIMES DAILY
Qty: 10 TABLET | Refills: 0 | Status: SHIPPED | OUTPATIENT
Start: 2022-01-06 | End: 2022-01-06

## 2022-01-06 RX ORDER — HYDROMORPHONE HCL IN WATER/PF 6 MG/30 ML
0.2 PATIENT CONTROLLED ANALGESIA SYRINGE INTRAVENOUS EVERY 5 MIN PRN
Status: DISCONTINUED | OUTPATIENT
Start: 2022-01-06 | End: 2022-01-06 | Stop reason: HOSPADM

## 2022-01-06 RX ORDER — GABAPENTIN 300 MG/1
300 CAPSULE ORAL 3 TIMES DAILY
Qty: 9 CAPSULE | Refills: 0 | DISCHARGE
Start: 2022-01-06

## 2022-01-06 RX ORDER — ROPIVACAINE HYDROCHLORIDE 5 MG/ML
INJECTION, SOLUTION EPIDURAL; INFILTRATION; PERINEURAL
Status: COMPLETED | OUTPATIENT
Start: 2022-01-06 | End: 2022-01-06

## 2022-01-06 RX ORDER — FENTANYL CITRATE 50 UG/ML
25 INJECTION, SOLUTION INTRAMUSCULAR; INTRAVENOUS
Status: DISCONTINUED | OUTPATIENT
Start: 2022-01-06 | End: 2022-01-06 | Stop reason: HOSPADM

## 2022-01-06 RX ORDER — FENTANYL CITRATE 0.05 MG/ML
50 INJECTION, SOLUTION INTRAMUSCULAR; INTRAVENOUS
Status: COMPLETED | OUTPATIENT
Start: 2022-01-06 | End: 2022-01-06

## 2022-01-06 RX ORDER — MAGNESIUM HYDROXIDE 1200 MG/15ML
LIQUID ORAL PRN
Status: DISCONTINUED | OUTPATIENT
Start: 2022-01-06 | End: 2022-01-06 | Stop reason: HOSPADM

## 2022-01-06 RX ORDER — ONDANSETRON 2 MG/ML
4 INJECTION INTRAMUSCULAR; INTRAVENOUS EVERY 30 MIN PRN
Status: DISCONTINUED | OUTPATIENT
Start: 2022-01-06 | End: 2022-01-06 | Stop reason: HOSPADM

## 2022-01-06 RX ORDER — FENTANYL CITRATE 0.05 MG/ML
25 INJECTION, SOLUTION INTRAMUSCULAR; INTRAVENOUS EVERY 5 MIN PRN
Status: DISCONTINUED | OUTPATIENT
Start: 2022-01-06 | End: 2022-01-06 | Stop reason: HOSPADM

## 2022-01-06 RX ORDER — LIDOCAINE 40 MG/G
CREAM TOPICAL
Status: DISCONTINUED | OUTPATIENT
Start: 2022-01-06 | End: 2022-01-06

## 2022-01-06 RX ORDER — LIDOCAINE 40 MG/G
CREAM TOPICAL
Status: DISCONTINUED | OUTPATIENT
Start: 2022-01-06 | End: 2022-01-06 | Stop reason: HOSPADM

## 2022-01-06 RX ORDER — DEXAMETHASONE SODIUM PHOSPHATE 4 MG/ML
INJECTION, SOLUTION INTRA-ARTICULAR; INTRALESIONAL; INTRAMUSCULAR; INTRAVENOUS; SOFT TISSUE PRN
Status: DISCONTINUED | OUTPATIENT
Start: 2022-01-06 | End: 2022-01-06

## 2022-01-06 RX ORDER — MEPERIDINE HYDROCHLORIDE 25 MG/ML
12.5 INJECTION INTRAMUSCULAR; INTRAVENOUS; SUBCUTANEOUS
Status: DISCONTINUED | OUTPATIENT
Start: 2022-01-06 | End: 2022-01-06 | Stop reason: HOSPADM

## 2022-01-06 RX ORDER — HYDROXYZINE HYDROCHLORIDE 10 MG/1
25 TABLET, FILM COATED ORAL EVERY 6 HOURS
Qty: 26 TABLET | Refills: 0 | DISCHARGE
Start: 2022-01-06

## 2022-01-06 RX ORDER — EPHEDRINE SULFATE 50 MG/ML
INJECTION, SOLUTION INTRAMUSCULAR; INTRAVENOUS; SUBCUTANEOUS PRN
Status: DISCONTINUED | OUTPATIENT
Start: 2022-01-06 | End: 2022-01-06

## 2022-01-06 RX ORDER — HYDROXYZINE HYDROCHLORIDE 10 MG/1
25 TABLET, FILM COATED ORAL EVERY 6 HOURS PRN
Qty: 26 TABLET | Refills: 0 | Status: SHIPPED | OUTPATIENT
Start: 2022-01-06 | End: 2022-01-06

## 2022-01-06 RX ORDER — LIDOCAINE HYDROCHLORIDE 20 MG/ML
INJECTION, SOLUTION INFILTRATION; PERINEURAL PRN
Status: DISCONTINUED | OUTPATIENT
Start: 2022-01-06 | End: 2022-01-06

## 2022-01-06 RX ORDER — FENTANYL CITRATE 50 UG/ML
INJECTION, SOLUTION INTRAMUSCULAR; INTRAVENOUS PRN
Status: DISCONTINUED | OUTPATIENT
Start: 2022-01-06 | End: 2022-01-06

## 2022-01-06 RX ORDER — ACETAMINOPHEN 500 MG
1000 TABLET ORAL EVERY 6 HOURS
DISCHARGE
Start: 2022-01-06

## 2022-01-06 RX ORDER — GABAPENTIN 300 MG/1
300 CAPSULE ORAL 3 TIMES DAILY
Qty: 9 CAPSULE | Refills: 0 | Status: SHIPPED | OUTPATIENT
Start: 2022-01-06 | End: 2022-01-06

## 2022-01-06 RX ORDER — OXYCODONE HYDROCHLORIDE 5 MG/1
5-10 TABLET ORAL EVERY 4 HOURS PRN
Qty: 26 TABLET | Refills: 0 | Status: SHIPPED | DISCHARGE
Start: 2022-01-06

## 2022-01-06 RX ORDER — ONDANSETRON 4 MG/1
4 TABLET, ORALLY DISINTEGRATING ORAL EVERY 30 MIN PRN
Status: DISCONTINUED | OUTPATIENT
Start: 2022-01-06 | End: 2022-01-06 | Stop reason: HOSPADM

## 2022-01-06 RX ORDER — OXYCODONE HYDROCHLORIDE 5 MG/1
5-10 TABLET ORAL EVERY 4 HOURS PRN
Qty: 26 TABLET | Refills: 0 | Status: SHIPPED | OUTPATIENT
Start: 2022-01-06 | End: 2022-01-06

## 2022-01-06 RX ADMIN — ROPIVACAINE HYDROCHLORIDE 26 ML: 5 INJECTION, SOLUTION EPIDURAL; INFILTRATION; PERINEURAL at 09:15

## 2022-01-06 RX ADMIN — FENTANYL CITRATE 50 MCG: 50 INJECTION, SOLUTION INTRAMUSCULAR; INTRAVENOUS at 10:31

## 2022-01-06 RX ADMIN — CLINDAMYCIN PHOSPHATE 900 MG: 900 INJECTION, SOLUTION INTRAVENOUS at 10:16

## 2022-01-06 RX ADMIN — DEXAMETHASONE SODIUM PHOSPHATE 4 MG: 4 INJECTION, SOLUTION INTRA-ARTICULAR; INTRALESIONAL; INTRAMUSCULAR; INTRAVENOUS; SOFT TISSUE at 10:31

## 2022-01-06 RX ADMIN — PHENYLEPHRINE HYDROCHLORIDE 100 MCG: 10 INJECTION INTRAVENOUS at 10:55

## 2022-01-06 RX ADMIN — SODIUM CHLORIDE, POTASSIUM CHLORIDE, SODIUM LACTATE AND CALCIUM CHLORIDE: 600; 310; 30; 20 INJECTION, SOLUTION INTRAVENOUS at 10:16

## 2022-01-06 RX ADMIN — PROPOFOL 200 MG: 10 INJECTION, EMULSION INTRAVENOUS at 10:20

## 2022-01-06 RX ADMIN — ONDANSETRON 4 MG: 2 INJECTION INTRAMUSCULAR; INTRAVENOUS at 11:10

## 2022-01-06 RX ADMIN — FENTANYL CITRATE 50 MCG: 0.05 INJECTION, SOLUTION INTRAMUSCULAR; INTRAVENOUS at 09:07

## 2022-01-06 RX ADMIN — PHENYLEPHRINE HYDROCHLORIDE 100 MCG: 10 INJECTION INTRAVENOUS at 10:27

## 2022-01-06 RX ADMIN — FENTANYL CITRATE 50 MCG: 50 INJECTION, SOLUTION INTRAMUSCULAR; INTRAVENOUS at 10:20

## 2022-01-06 RX ADMIN — LIDOCAINE HYDROCHLORIDE 60 MG: 20 INJECTION, SOLUTION INFILTRATION; PERINEURAL at 10:20

## 2022-01-06 RX ADMIN — PHENYLEPHRINE HYDROCHLORIDE 100 MCG: 10 INJECTION INTRAVENOUS at 10:44

## 2022-01-06 RX ADMIN — MIDAZOLAM 2 MG: 1 INJECTION INTRAMUSCULAR; INTRAVENOUS at 10:16

## 2022-01-06 RX ADMIN — Medication 5 MG: at 10:37

## 2022-01-06 RX ADMIN — Medication 8 MCG: at 11:10

## 2022-01-06 RX ADMIN — MIDAZOLAM HYDROCHLORIDE 2 MG: 1 INJECTION, SOLUTION INTRAMUSCULAR; INTRAVENOUS at 09:07

## 2022-01-06 RX ADMIN — PHENYLEPHRINE HYDROCHLORIDE 0.4 MCG/KG/MIN: 10 INJECTION INTRAVENOUS at 10:52

## 2022-01-06 RX ADMIN — Medication 5 MG: at 10:44

## 2022-01-06 ASSESSMENT — MIFFLIN-ST. JEOR: SCORE: 1520.18

## 2022-01-06 NOTE — OP NOTE
PREOPERATIVE DIAGNOSIS:   1.  Right Lisfranc fracture dislocation.  2.  Left stable Guillaume B distal fibula fracture.    POSTOPERATIVE DIAGNOSIS:   1.  Right Lisfranc fracture dislocation.  2.  Left stable Guillaume B distal fibula fracture.    PROCEDURE(S):   1.  ORIF right Lisfranc fracture dislocation.  2.  Left ankle stress examination under anesthesia with fluoroscopy.    ATTENDING SURGEON: Dr. Alek Cunningham.    ASSISTANT SURGEON: Lev Ramos PA-C.  SECONDARY ASSISTANT: Bahman Ortez MD.    ANESTHESIA: General with right lower extremity regional nerve block.    EBL: Minimal.    IMPLANTS: Eldred 28 4.0 mm fully threaded cannulated screw; Eldred 28 dorsal 2-3 TMT plate with associated interlocking screws.    COMPLICATIONS: None apparent.    Skilled surgical assistants, Lev Ramos PA-C and Bahman Ortez MD, weres necessary and utilized during the case to assist with patient positioning, prepping and draping, completing the soft tissue/bone work, wound closure, and dressing/splint application.  The assistants were utilized throughout the entire case.    INDICATIONS: Tresa is a pleasant 66 year-old female who presented to my clinic for evaluation of bilateral lower extremity injuries.  The patient has sustained a mechanical fall while visiting her daughter.  Examination and imaging studies demonstrated evidence of an unstable right Lisfranc fracture dislocation and a left stable Guillaume B distal fibula fracture.  Operative intervention was recommended for the right midfoot to restore stable alignment of the midfoot, allow for appropriate fracture healing, and limit the risk of posttraumatic arthritis and deformity.  While undergoing operative intervention for the right foot, I recommended stress examination of the left ankle under anesthesia to ensure the ankle mortise and Guillaume B distal fibula fracture were stable.  After full discussion of the benefits and risks of surgery, the patient provided informed consent  to proceed.    The patient was identified in the pre-operative holding area on the date of surgery.  The operative site was marked with indelible marker and the patient was brought back to the operating room and transferred to the operating table in a supine position.  All bony prominences were well-padded.  Anesthesia was administered without complication.  A pre-operative timeout was performed identifying the correct patient, procedure, operative site, antibiotic administration, and equipment necessary for the procedure.    Under fluoroscopic imaging, the left ankle was placed under valgus and external rotation stress without any evidence of medial clear space or syndesmotic widening.  The distal fibula fracture remained in a relatively nondisplaced position.  Therefore, further intervention for the ankle was not required and we will continue to manage the patient's left ankle fracture nonoperatively.  The ankle will remain immobilized in a boot.    The right lower extremity was prepped and draped in standard sterile fashion.  A preoperative timeout was once again performed identifying the correct procedure and operative site to address the right Lisfranc fracture dislocation.  After Esmarch exsanguination, a supramalleolar tourniquet was secured.  A longitudinal incision was made over the dorsal aspect of the second TMT joint.  Soft tissue dissection was carefully carried down maintaining excellent hemostasis.  The extensor tendons to the toes were carefully reflected for protection throughout the remainder the case.  The deep neurovascular bundle was also identified and the deep peroneal nerve was protected.  Periosteum and joint capsule were elevated over the dorsal aspect of the second and third TMT joints and around the Lisfranc complex.  Obvious instability of the Lisfranc complex was noted.  Scar tissue was debrided from within the Lisfranc joint to allow for anatomic reduction of the Lisfranc joint.   Pointed reduction forceps were applied from a percutaneous incision at the base of the medial cuneiform to the lateral base of the second metatarsal.  After confirming anatomic reduction of the Lisfranc joint and second TMT joint, the Lisfranc joint was secured with a Upatoi 28 4.0 mm fully threaded cannulated screw.  To provide additional stability across the TMT joints, and given the sagittal plane instability of the TMT joints, a dorsal 2-3 TMT plate was secured over the second and third TMT joints.  Fluoroscopic imaging confirmed excellent stability across the Lisfranc and TMT joints.  The first TMT joint was placed under stress without evidence of further instability.  The wounds were copiously irrigated.  Subcutaneous tissues and skin were reapproximated with interrupted 4-0 Monocryl and 4-0 nylon sutures respectively.  Xeroform and sterile dressings were applied.  The patient was placed in a short leg splint.  The patient was brought to the PACU in stable condition for further postoperative care.    Postoperatively, the patient will remain nonweightbearing on the right lower extremity.  The patient will be allowed to progressively bear weight as tolerated on the left ankle in a tall CAM boot.  The patient will be placed on aspirin for DVT prophylaxis postoperatively.  The patient will return to clinic for follow-up in 2 weeks for repeat evaluation including wound check, suture removal, and weightbearing radiographs of the left ankle and nonweightbearing radiographs of the right foot.    Of note, all counts were correct at the conclusion of the case.

## 2022-01-06 NOTE — ANESTHESIA PREPROCEDURE EVALUATION
Anesthesia Pre-Procedure Evaluation    Patient: Tresa Combs   MRN: [id number] : 1955        Preoperative Diagnosis: Closed fracture of tarsal and metatarsal bones [S92.209A, S92.309A]    Procedure : Procedure(s):  OPEN REDUCTION INTERNAL FIXATION RIGHT LISFANC FRACTURE  LEFT ANKLE EXAM EXAM UNDER ANESTHESIA          Past Medical History:   Diagnosis Date     Hypertension       Past Surgical History:   Procedure Laterality Date     AL VAGINAL HYSTERECTOMY,UTERUS 250 GMS/<      Description: Vaginal Hysterectomy;  Recorded: 2013;     ZZC TOTAL ABDOM HYSTERECTOMY      Description: Hysterectomy;  Recorded: 2008;      Allergies   Allergen Reactions     Ceclor [Cefaclor] Rash     Tramadol Rash      Social History     Tobacco Use     Smoking status: Not on file     Smokeless tobacco: Not on file   Substance Use Topics     Alcohol use: Not on file      Wt Readings from Last 1 Encounters:   21 95.3 kg (210 lb)        Anesthesia Evaluation            ROS/MED HX  ENT/Pulmonary:     (+) sleep apnea,     Neurologic:  - neg neurologic ROS     Cardiovascular:     (+) hypertension-----    METS/Exercise Tolerance:     Hematologic:  - neg hematologic  ROS     Musculoskeletal:   (+) fracture, Fracture location: RUE and RLE (Right distal radius fx, Right lisfranc fracture),     GI/Hepatic:    (-) GERD   Renal/Genitourinary:       Endo:     (+) Obesity,     Psychiatric/Substance Use:  - neg psychiatric ROS     Infectious Disease:       Malignancy:       Other:            Physical Exam    Airway        Mallampati: II   TM distance: > 3 FB   Neck ROM: full   Mouth opening: > 3 cm    Respiratory Devices and Support         Dental  no notable dental history         Cardiovascular   cardiovascular exam normal          Pulmonary   pulmonary exam normal                OUTSIDE LABS:  CBC:   Lab Results   Component Value Date    WBC 10.0 2021    HGB 13.3 2021    HCT 39.0 2021     2021      BMP:   Lab Results   Component Value Date     12/28/2021    POTASSIUM 3.8 12/28/2021    CHLORIDE 105 12/28/2021    CO2 31 12/28/2021    BUN 17 12/28/2021    CR 0.73 12/29/2021    CR 0.71 12/28/2021     (H) 12/28/2021     COAGS: No results found for: PTT, INR, FIBR  POC: No results found for: BGM, HCG, HCGS  HEPATIC: No results found for: ALBUMIN, PROTTOTAL, ALT, AST, GGT, ALKPHOS, BILITOTAL, BILIDIRECT, SHAHID  OTHER:   Lab Results   Component Value Date    A1C 5.3 10/07/2009    AVELINO 9.2 12/28/2021       Anesthesia Plan    ASA Status:  2   NPO Status:  NPO Appropriate    Anesthesia Type: General.     - Airway: LMA   Induction: Propofol, Intravenous.   Maintenance: Balanced.        Consents    Anesthesia Plan(s) and associated risks, benefits, and realistic alternatives discussed. Questions answered and patient/representative(s) expressed understanding.    - Discussed:     - Discussed with:  Patient         Postoperative Care    Pain management: IV analgesics, Peripheral nerve block (Single Shot), Multi-modal analgesia.   PONV prophylaxis: Ondansetron (or other 5HT-3), Dexamethasone or Solumedrol     Comments:                Dillan Chun DO, DO

## 2022-01-06 NOTE — DISCHARGE INSTRUCTIONS
Post-Operative Instruction Sheet for Foot and Ankle Surgery  Alek Cunningham M.D.      These precautions MUST be followed for the first 24 hours after surgery:    Upon discharge, go directly home.    You MUST make arrangements for a responsible adult to stay with you the first night following surgery.  Surgery may be cancelled if you do not have someone that can stay with you.    DO NOT DRINK ALCOHOLIC BEVERAGES.    It is not unusual to feel lightheaded up to 24 hours after surgery or while taking pain medications.  If you feel lightheaded, sit up for a few minutes before standing and have someone assist you when you get up to walk or use the restroom.    Do not use any mechanical equipment or heavy machinery.    Do not make any important or legal decisions for 24 hours or while on pain medication.    You may experience dry mouth, sore throat, or sleep disturbances from the anesthesia and medications used during surgery.  Generalized muscle aches can sometimes occur.  These symptoms generally disappear by 24 hours.    The following are general guidelines and instructions about what to expect the first weeks following surgery.  These are not specific, and your recovery may be slightly different.  Please follow the instructions that are specifically written out for you after the surgery if there are any questions.    Pain Management   If you have received a nerve block, the pain relief can last anywhere from 12-30 hours, this also means you may not have sensation or movement in your foot for that amount of time. You will have pain after the block wears off!  Anticipate this and start pain meds prior to the block wearing off.     When you get home start taking your over the counter pain medications as well as the non narcotic prescribed medication right away.    If your nerve block is still intact when going to sleep I would advise taking your narcotic even if you don't have pain.    Continue to take your  medications as prescribed for the first 24-48 hours - ensure that the patient (you) are alert and have no difficulty breathing before taking the medication.  Often it may be helpful to set an alarm throughout the night to ensure you don t miss a dose of the medication and wake up with a lot more pain.    You can expect that the first two nights will be the most painful and uncomfortable. I will give you strong medication to make you as comfortable as possible, but you may still have some break-through pain.  This is not unexpected, as there are many nerve endings in the foot and ankle and many patients state the pain from foot surgery is worse than most other injuries or procedures!    After the first few days, take the medication as needed for pain.    As your pain improves, you can gradually decrease your pain meds by utilizing Tylenol or an anti-inflammatory medication.  You should also use these medications as directed early in the post-operative process to supplement the narcotic pain medication.    Dressing   Bleeding through the dressings is quite common.    This usually occurs for the first 1-2 hours after surgery. The actual bleeding has stopped by the time you see the drainage through your dressings.    You can reinforce the outside of the dressing with gauze and an Ace wrap unless otherwise directed.  In most cases, your first dressing change will be performed at your first clinic follow-up visit.  In some cases, I may allow you to change your dressings sooner.  Your dressing should be kept DRY at all times - do not shower, bathe, or wet your dressing in any way after surgery!    Wound Care  Once your stitches are removed, you can shower with soapy water and gently cleanse the incision if it is completely dry.     Do not shower or wash the incision(s) if parts of the incision(s) are open or still draining.    Do not soak the incision(s) in a bathtub or hot tub until the incision(s) is completely dry for  one week.    Do not soak the incision(s) in lake or ocean water for at least one month post-operatively.    Elevation   I recommend strict elevation of your foot above the level of your heart for 4-5 days.  Elevation of the foot remains important up to two weeks after surgery to limit swelling and help wound healing.    Elevate your foot/ankle to at least your waist level but above your heart would be best. The more you elevate your foot and ankle, the less pain you will have.     In the first few days following surgery, restrict the time your foot is  down  to 10 minutes or less at a time.    It is also good to keep your blood flowing through the operated leg and limit the risk of blood clots.  The first day following surgery, I would encourage you to get up and move around the house for a few minutes every hour and then return to elevating the foot.    After the strict elevation period (see above) is completed, you may gradually become more active. You should  listen  to your foot/ankle as to when to get off of it and elevate it again.  This may help even months after surgery.  Remember: avoid anything that hurts or makes your foot/ankle swell!    Icing   Icing can be very useful to decrease the pain and swelling of the foot.     Start by first placing a large garbage bag over the dressing.  Ice may then be placed around the extremity by using either bags of ice taped around the extremity   or you may place the extremity in a bucket filled with ice (the dressing MUST be covered with a plastic bag!).  Bags of frozen peas work well!    You should ice for no more than 20 minutes at a time and repeat at 2 hour intervals.     Do NOT place ice directly on your skin or dressing.     Activity     In most cases (unless otherwise instructed), you may not bear weight on your operated foot/ankle for 2 weeks after surgery.  That means the foot may not touch the ground when upright!  Specific weight bearing instructions for  your surgery will be provided on the day of surgery.    Your toes may experience bruising after surgery and become darker when the foot hangs down.  Unless you had surgery on those toes, it is important to actively wiggle your toes for 5-10 minutes each hour.    Many of your questions can be addressed at your 2-week follow-up appointment - please make a list of things to ask us as they come up during your recovery.    What to watch for      Severe swelling and/or pain in the calf: this could indicate a deep vein thrombosis (blood clot in leg) which requires urgent evaluation and treatment!    Profuse bleeding: that which soaks through your dressing and increases in size throughout the first day after surgery.    Blue or white toes: this indicates a lack of blood flow to the foot.     Fever greater than 101.5: fevers less than this are very common the first few days after surgery and are unlikely to indicate infection or any unexpected problem.    Severe pain: that which does not improve after pain medication, except for the first two nights.   If you have any of the above problems or any concerns, please contact my office ([phone]) and further instructions will be provided.  If you are unable to reach anyone or feel you have a medical emergency, please do not hesitate to go to the nearest urgent care or emergency department.    Medications   *Medications may take up to 24 hours to be refilled by my office.*    All pain medications, along with inactivity following surgery, can cause constipation.  Use the stool softeners as recommended, increase fluid intake to at least 1 quart per day, and increase your dietary fiber.  (The  p  fruits - peaches, plums, pears, and prunes - as well as anise/black licorice are generally helpful.)      Antibiotics may be prescribed to limit the risk of infection only in limited circumstances.  Kelfex (cephalexin) 500 mg - This is an antibiotic given in addition to the antibiotic  given during surgery to help reduce the chance of post-operative infection.   Dosage: 1 tablet by mouth 4 times a day.  OR   Cleocin (clindamycin) 600 mg - This is an antibiotic given to those patients who are allergic to penicillin in addition to the antibiotic given during surgery to help reduce the chance of post-operative infection.   Dosage: 1 tablet by mouth 3 times a day.      Anti-nausea and spasms  Hydroxyzine 25 mg  - This medicine should be taken if you experience any nausea or vomiting. If you know you are sensitive to narcotics please take 1 tablet 30 minutes prior to pain medication. This may also help with any mild itching experienced with the pain medication or muscle spasms.  Dosage: 1 tablet by mouth every 6 hours as needed for nausea, itching, spasms, or adjuvant pain control.      Pain medications (you will only be given a prescription for ONE of the following!)   Ultram (tramadol) 50mg - This is a pain medication that should be taken EVERY 4 TO 6 HOURS for pain relief. You should start the medication when you arrive home after surgery, before the nerve block wears off.  The first 1-2 nights you may need to take 2 tablets every 4 hours.  You should be given enough medication to last to the first office visit.  This medication cannot be refilled over the phone!  Dosage: 1-2 tablets every 4-6 hours as needed for pain relief.  OR  Oxycodone 5mg - This is a pain medication that may be taken EVERY 3 to 4 HOURS for pain relief.  You should start the medication when you arrive home after surgery, before the nerve block wears off.  The first 1-2 nights you may need to take up to 2 or even 3 tablets every 3-4 hours. You should be given enough medication to last to the first office visit.  This medication cannot be refilled over the phone!  Dosage: 1-2 (or 3) tablets every 3-4 hours as needed for pain relief.  OR  Norco (hydrocodone/acetominophen) 5/325 mg - This is a pain medication that should be taken  EVERY 4 TO 6 HOURS for pain relief. You should start the medication when you arrive home after surgery, before the nerve block wears off.  The first 1-2 nights you may need to take 2 tablets every 4 hours.  You should be given enough medication to last to the first office visit.  This medication cannot be refilled over the phone!  Dosage: 1-2 tablets every 4-6 hours as needed for pain relief.  *Do NOT take any Tylenol while taking this medication!  You may alternate Tylenol with this medication provided you do not take greater than 3 grams of Tylenol in total over a 24 hour time period.      Blood thinner  Depending on the type of surgery and your personal risk factors, I may prescribe a medication to help limit the risk of developing a blood clot after your surgery.  The length of time to take the recommended medication will be provided and typically lasts until you are moving about normally or bearing weight on the operated foot/ankle.  ECASA (enteric coated aspirin) 325mg tablet daily.  Lovenox (enoxaparin) 40mg subcutaneous injection daily.  Xarelto 10mg tablet daily.      Stool Softener  Take an over the counter stool softener such as senna or Miralax starting the day after your surgery to prevent constipation. This is a common side effect of the narcotic pain medications.  You may stop taking this after you have regular bowel movements or no longer require use of narcotic pain medications.    Dental Implications  Dental procedures should be avoided until your incisions are healed. Furthermore, surgical procedures including hardware or an allograft require taking an antibiotic within one hour of all dental work within 6 months of surgery. Total ankle replacements require indefinite use of antibiotics prior to dental procedures. We would be happy to provide you with the necessary prescription upon request.    Follow-up Visits  You should have your initial post-operative visits already scheduled but if you do not  recall the exact dates or do not believe they have been scheduled, please contact [facility] right away to ensure that we have the appropriate visits in the system.    You will likely follow-up with my physician assistant for your first post-operative visit and for a few of the additional follow-up visits.  He works directly with me on all patients and will be able to inform me if there are any concerns during your recovery process!        You can often find additional information about your procedure or condition on the [facility] website at [email or web address] or [email or web address].    Additional information from reputable orthopaedic foot and ankle surgeons affiliated with the American [employer] Foot and Ank[employer] Society can be found at [email or web address].      Post-operative Foot and Ankle Surgery Instructions and Tips for Pain Control  Dr. Cunningham, [facility]    Non-medication Interventions    Read your post-operative instruction handout!    Elevate the leg at the heart level 95% of the time for the first 2-3 days.    Limit the amount of time the foot and ankle are  down  for no more than 10 minutes at a time the first few days.    Ice consistently for the first 2-3 days.  o If on bare skin or a thin dressing, limit icing to 20 minutes per hour.  o If around a splint, apply the ice behind the knee for 20 minutes per hours or over the splint around the ankle as much as tolerated.    Do not plan extra activity for the first 2-3 days after surgery.    Expect the foot or ankle will be painful - surgery hurts!  The pain will get better.  Trust the process.    Non-opiate Medications  Start these medications right away after you get home from surgery and continue on a regular schedule for at least 3 days.  As you pain allows, start to use as needed until your first clinic visit after surgery.  It may be helpful to alternate the ibuprofen/Celebrex and  Tylenol to maximize pain relief.  In rare cases, I may recommend avoiding ibuprofen or aleve after surgery - this will be made clear at the time of surgery.    Motrin or Advil (ibuprofen) 600mg every 6 hours OR Celebrex 100mg every 12 hours.    Tylenol (acetaminophen) 650mg every 6 hours.      Neurontin (gabapentin) 300mg every 8 hours for the first 3 days only.      Hydroxyzine 25mg (or 10mg if >65 years of age) every 6 hours.    Opiate Medications  These medications should be used sparingly, just as needed, and for the first few days after surgery.  Please see the below guidelines for details.    Ultram (tramadol) 50mg, 1-2 tablets every 4 hours as needed.  OR    Oxycodone 5mg, 1-2 tablets every 3 hours as needed.    Opiate pain medications can be very effective, but carry a number of potentially harmful side effects including tolerance and addiction if used inappropriately.  They will be the most effective the first few days following surgery in the following situations:    If you had a nerve block before surgery, take one pill a few hours after you get home from the surgery center.  Keep to a regular schedule with the medication, taking just one pill every 4 hours until the block wears off (tingling, pins and needles, increased movement in the toes, increased pain, etc.).  Take 1-2 pills again 4 hours later.    Take 1-2 pills at bedtime the first few days after surgery to help sleep and limit pain through the night.    Take 1-2 pills for  rescue  when pain is not controlled by the regularly scheduled medications and non-medication interventions.    You should generally feel less need for the opiate pain medication after the first few days after surgery.  Remember, foot and ankle surgery hurts!  The goal of medication management is to  take the edge off  and keep the pain level tolerable.  Trust the process - the pain will get better!    Multiple studies indicate that opioid pain medication is not needed beyond a  few days postoperatively and prolonged use of these medications can actually lead to increased perception of pain and tolerance/addiction issues.  The CDC and state boards have been recommending and enforcing restrictions on opioid prescribing in light of the significant consequences that arise from chronic opioid use and [facility] is adopting many of these recommendations for your safety and well-being.      1. Ella et al, Satisfaction with Pain Relief After Operative Treatment of an Ankle Fracture, Injury. 2012; 43(11):1958-61.  2. Ella et al, Pain Relief After Operative Treatment of an Extremity Fracture, [facility]. 2017; 99:1908-15.  3. Kervin et al, Support for Safer Opioid Prescribing Practices, [facility]. 2017; 99:1945-55.  4. Ryan et al, Liposomal Bupivacaine in Forefoot Surgery, Foot Ankle Int. 2015; 36(5):503-7.  5. Anna et al, Addition of Pregabalin to Multimodal Analgesic Therapy Following Ankle Surgery, Reg Anesth Pain Med. 2012; 37(3):302-7.      Same Day Surgery Discharge Instructions for  Sedation and General Anesthesia       It's not unusual to feel dizzy, light-headed or faint for up to 24 hours after surgery or while taking pain medication.  If you have these symptoms: sit for a few minutes before standing and have someone assist you when you get up to walk or use the bathroom.      You should rest and relax for the next 24 hours. We recommend you make arrangements to have an adult stay with you for at least 24 hours after your discharge.  Avoid hazardous and strenuous activity.      DO NOT DRIVE any vehicle or operate mechanical equipment for 24 hours following the end of your surgery.  Even though you may feel normal, your reactions may be affected by the medication you have received.      Do not drink alcoholic beverages for 24 hours following surgery.       Slowly progress to your regular diet as you feel able. It's not unusual to feel nauseated and/or vomit after receiving  anesthesia.  If you develop these symptoms, drink clear liquids (apple juice, ginger ale, broth, 7-up, etc. ) until you feel better.  If your nausea and vomiting persists for 24 hours, please notify your surgeon.        All narcotic pain medications, along with inactivity and anesthesia, can cause constipation. Drinking plenty of liquids and increasing fiber intake will help.      For any questions of a medical nature, call your surgeon.      Do not make important decisions for 24 hours.      If you had general anesthesia, you may have a sore throat for a couple of days related to the breathing tube used during surgery.  You may use Cepacol lozenges to help with this discomfort.  If it worsens or if you develop a fever, contact your surgeon.       If you feel your pain is not well managed with the pain medications prescribed by your surgeon, please contact your surgeon's office to let them know so they can address your concerns.       CoVid 19 Information    We want to give you information regarding Covid. Please consult your primary care provider with any questions you might have.     Patient who have symptoms (cough, fever, or shortness of breath), need to isolate for 7 days from when symptoms started OR 72 hours after fever resolves (without fever reducing medications) AND improvement of respiratory symptoms (whichever is longer).      Isolate yourself at home (in own room/own bathroom if possible)    Do Not allow any visitors    Do Not go to work or school    Do Not go to [employer],  centers, shopping, or other public places.    Do Not shake hands.    Avoid close and intimate contact with others (hugging, kissing).    Follow CDC recommendations for household cleaning of frequently touched services.     After the initial 7 days, continue to isolate yourself from household members as much as possible. To continue decrease the risk of community spread and exposure, you and any members of your household  should limit activities in public for 14 days after starting home isolation.     You can reference the following CDC link for helpful home isolation/care tips:  https://www.cdc.gov/coronavirus/2019-ncov/downloads/10Things.pdf    Protect Others:    Cover Your Mouth and Nose with a mask, disposable tissue or wash cloth to avoid spreading germs to others.    Wash your hands and face frequently with soap and water    Call Your Primary Doctor If: Breathing difficulty develops or you become worse.    For more information about COVID19 and options for caring for yourself at home, please visit the CDC website at https://www.cdc.gov/coronavirus/2019-ncov/about/steps-when-sick.html  For more options for care at [facility], please visit our website at [email or web address]    **If you have questions or concerns about your procedure, call   Dr. Cunningham at [phone].**

## 2022-01-06 NOTE — OR NURSING
Report and discharge instructions reviewed over the phone with RN at [facility].  Prescriptions filled except aspirin(hard copy of aspirin sent with pt for facility to fill) meds x4 sent with pt

## 2022-01-06 NOTE — ANESTHESIA PROCEDURE NOTES
Airway       Patient location during procedure: OR       Procedure Start/Stop Times: 1/6/2022 10:22 AM  Staff -        Anesthesiologist:  Dillan Chun DO       CRNA: Noemi Gordon APRN CRNA       Performed By: CRNA  Consent for Airway        Urgency: elective  Indications and Patient Condition       Indications for airway management: tayla-procedural and airway protection       Induction type:intravenous       Mask difficulty assessment: 0 - not attempted    Final Airway Details       Final airway type: supraglottic airway    Supraglottic Airway Details        Type: LMA       Brand: Ambu AuraGain       LMA size: 4    Post intubation assessment        Placement verified by: capnometry and chest rise        Number of attempts at approach: 1       Number of other approaches attempted: 0       Secured with: pink tape       Ease of procedure: easy       Dentition: Unchanged

## 2022-01-06 NOTE — ANESTHESIA CARE TRANSFER NOTE
Patient: Tresa Combs    Procedure: Procedure(s):  OPEN REDUCTION INTERNAL FIXATION RIGHT LISFANC FRACTURE  LEFT ANKLE EXAM EXAM UNDER ANESTHESIA       Diagnosis: Closed fracture of tarsal and metatarsal bones [S92.209A, S92.309A]  Diagnosis Additional Information: No value filed.    Anesthesia Type:   General     Note:    Oropharynx: oropharynx clear of all foreign objects and spontaneously breathing  Level of Consciousness: awake  Oxygen Supplementation: face mask  Level of Supplemental Oxygen (L/min / FiO2): 5  Independent Airway: airway patency satisfactory and stable  Dentition: dentition unchanged  Vital Signs Stable: post-procedure vital signs reviewed and stable  Report to RN Given: handoff report given  Patient transferred to: PACU    Handoff Report: Identifed the Patient, Identified the Reponsible Provider, Reviewed the pertinent medical history, Discussed the surgical course, Reviewed Intra-OP anesthesia mangement and issues during anesthesia, Set expectations for post-procedure period and Allowed opportunity for questions and acknowledgement of understanding      Vitals:  Vitals Value Taken Time   /71 01/06/22 1131   Temp     Pulse 88 01/06/22 1138   Resp 11 01/06/22 1138   SpO2 96 % 01/06/22 1138   Vitals shown include unvalidated device data.    Electronically Signed By: CHEN Lu CRNA  January 6, 2022  11:39 AM

## 2022-01-06 NOTE — ANESTHESIA POSTPROCEDURE EVALUATION
Patient: Tresa Combs    Procedure: Procedure(s):  OPEN REDUCTION INTERNAL FIXATION RIGHT LISFANC FRACTURE  LEFT ANKLE EXAM EXAM UNDER ANESTHESIA       Diagnosis:Closed fracture of tarsal and metatarsal bones [S92.209A, S92.309A]  Diagnosis Additional Information: No value filed.    Anesthesia Type:  General    Note:  Disposition: Outpatient   Postop Pain Control: Uneventful            Sign Out: Well controlled pain   PONV: No   Neuro/Psych: Uneventful            Sign Out: Acceptable/Baseline neuro status   Airway/Respiratory: Uneventful            Sign Out: Acceptable/Baseline resp. status   CV/Hemodynamics: Uneventful            Sign Out: Acceptable CV status; No obvious hypovolemia; No obvious fluid overload   Other NRE: NONE   DID A NON-ROUTINE EVENT OCCUR? No           Last vitals:  Vitals Value Taken Time   /66 01/06/22 1230   Temp 36.7  C (98  F) 01/06/22 1200   Pulse 87 01/06/22 1230   Resp 11 01/06/22 1230   SpO2 96 % 01/06/22 1230       Electronically Signed By: Dillan Chun DO,   January 6, 2022  1:49 PM

## 2022-01-06 NOTE — ANESTHESIA PROCEDURE NOTES
Ankle Procedure Note    Pre-Procedure   Staff -        Anesthesiologist:  Dillan Chun DO       Performed By: anesthesiologist       Location: pre-op       Pre-Anesthestic Checklist: patient identified, IV checked, site marked, risks and benefits discussed, informed consent, monitors and equipment checked, pre-op evaluation, at physician/surgeon's request and post-op pain management  Timeout:       Correct Patient: Yes        Correct Procedure: Yes        Correct Site: Yes        Correct Position: Yes        Correct Laterality: Yes        Site Marked: Yes  Procedure Documentation  Procedure: Ankle       Laterality: right       Patient Position: supine       Patient Prep/Sterile Barriers: sterile gloves, mask, patient draped       Skin prep: Chloraprep       Local skin infiltrated with 3 mL of 1% lidocaine.        Needle Type: insulated and short bevel       Needle Gauge: 22.        Needle Length (millimeters): 50        Ultrasound guided       1. Ultrasound was used to identify targeted nerve, plexus, vascular marker, or fascial plane and place a needle adjacent to it in real-time.       2. Ultrasound was used to visualize the spread of anesthetic in close proximity to the above referenced structure.       3. A permanent image is entered into the patient's record.    Assessment/Narrative         The placement was negative for: blood aspirated, painful injection and site bleeding       Paresthesias: No.    Test dose of mL at.         Test dose negative, 3 minutes after injection, for signs of intravascular, subdural, or intrathecal injection.     Bolus given via needle..        Secured via.        Insertion/Infusion Method: Single Shot       Complications: none    Medication(s) Administered   Ropivacaine 0.5% PF (Infiltration), 26 mL  Medication Administration Time: 1/6/2022 9:15 AM     Comments:  Ultrasound Interpretation, peripheral nerve block    1.  Under ultrasound guidance, the needle was inserted  and placed in close proximity to the target nerve(s).  2. Ultrasound was also used to visualize the spread of the anesthetic in close proximity to the nerve(s) being blocked.  26 ml of 0.5% Ropivacaine w/ 1:400K Epi, in total, was administered in incremental doses, with intermittent negative aspiration.     3. The nerve(s) appeared anatomically normal.  4. There were no apparent abnormal pathological findings.  5. A permanent ultrasound image was saved in the patient's record.    Pt tolerated the procedure well.     The surgeon has given a verbal order transferring care of this patient to me for the performance of a regional analgesia block for post-op pain control. It is requested of me because I am uniquely trained and qualified to perform this block and the surgeon is neither trained nor qualified to perform this procedure.

## 2022-02-05 ENCOUNTER — HEALTH MAINTENANCE LETTER (OUTPATIENT)
Age: 67
End: 2022-02-05

## 2022-09-25 ENCOUNTER — HEALTH MAINTENANCE LETTER (OUTPATIENT)
Age: 67
End: 2022-09-25

## 2023-05-13 ENCOUNTER — HEALTH MAINTENANCE LETTER (OUTPATIENT)
Age: 68
End: 2023-05-13

## 2024-03-02 ENCOUNTER — HEALTH MAINTENANCE LETTER (OUTPATIENT)
Age: 69
End: 2024-03-02

## 2024-07-20 ENCOUNTER — HEALTH MAINTENANCE LETTER (OUTPATIENT)
Age: 69
End: 2024-07-20

## (undated) DEVICE — IMM LIMB ELEVATOR DC40-0203

## (undated) DEVICE — Device

## (undated) DEVICE — CAST PLASTER SPLINT 5X30" 7395

## (undated) DEVICE — SUCTION CANISTER MEDIVAC LINER 3000ML W/LID 65651-530

## (undated) DEVICE — ESU PENCIL W/HOLSTER E2350H

## (undated) DEVICE — SOL WATER IRRIG 1000ML BOTTLE 2F7114

## (undated) DEVICE — DRAPE IOBAN INCISE 23X17" 6650EZ

## (undated) DEVICE — PACK EXTREMITY SOP15EXFSD

## (undated) DEVICE — SU MONOCRYL 3-0 PS-2 27" Y427H

## (undated) DEVICE — GLOVE PROTEXIS BLUE W/NEU-THERA 8.0  2D73EB80

## (undated) DEVICE — GLOVE PROTEXIS POWDER FREE 7.5 ORTHOPEDIC 2D73ET75

## (undated) DEVICE — GOWN IMPERVIOUS SPECIALTY XLG/XLONG 32474

## (undated) DEVICE — DRSG XEROFORM 1X8"

## (undated) DEVICE — GLOVE PROTEXIS POWDER FREE 8.0 ORTHOPEDIC 2D73ET80

## (undated) DEVICE — BNDG ELASTIC 4" DBL LENGTH UNSTERILE 6611-14

## (undated) DEVICE — CAST PADDING 4" UNSTERILE 9044

## (undated) DEVICE — SPONGE LAP 18X18" X8435

## (undated) DEVICE — DRAPE MINI C-ARM 4003

## (undated) DEVICE — LINEN TOWEL PACK X5 5464

## (undated) DEVICE — IMPLANTABLE DEVICE: Type: IMPLANTABLE DEVICE | Site: ANKLE | Status: NON-FUNCTIONAL

## (undated) DEVICE — SU VICRYL 2-0 CT-2 27" UND J269H

## (undated) DEVICE — PREP CHLORAPREP 26ML TINTED ORANGE  260815

## (undated) RX ORDER — ONDANSETRON 2 MG/ML
INJECTION INTRAMUSCULAR; INTRAVENOUS
Status: DISPENSED
Start: 2022-01-06

## (undated) RX ORDER — DEXAMETHASONE SODIUM PHOSPHATE 4 MG/ML
INJECTION, SOLUTION INTRA-ARTICULAR; INTRALESIONAL; INTRAMUSCULAR; INTRAVENOUS; SOFT TISSUE
Status: DISPENSED
Start: 2022-01-06

## (undated) RX ORDER — CLINDAMYCIN PHOSPHATE 900 MG/50ML
INJECTION, SOLUTION INTRAVENOUS
Status: DISPENSED
Start: 2022-01-06

## (undated) RX ORDER — FENTANYL CITRATE 50 UG/ML
INJECTION, SOLUTION INTRAMUSCULAR; INTRAVENOUS
Status: DISPENSED
Start: 2022-01-06

## (undated) RX ORDER — FENTANYL CITRATE 0.05 MG/ML
INJECTION, SOLUTION INTRAMUSCULAR; INTRAVENOUS
Status: DISPENSED
Start: 2022-01-06